# Patient Record
Sex: MALE | Race: WHITE | NOT HISPANIC OR LATINO | Employment: FULL TIME | URBAN - METROPOLITAN AREA
[De-identification: names, ages, dates, MRNs, and addresses within clinical notes are randomized per-mention and may not be internally consistent; named-entity substitution may affect disease eponyms.]

---

## 2023-07-19 LAB
ABO GROUP (TYPE) IN BLOOD: NORMAL
ALANINE AMINOTRANSFERASE (SGPT) (U/L) IN SER/PLAS: 14 U/L (ref 10–52)
ALBUMIN (G/DL) IN SER/PLAS: 4.2 G/DL (ref 3.4–5)
ALBUMIN (G/DL) IN SER/PLAS: 4.2 G/DL (ref 3.4–5)
ALKALINE PHOSPHATASE (U/L) IN SER/PLAS: 46 U/L (ref 33–120)
ANION GAP IN SER/PLAS: 14 MMOL/L (ref 10–20)
ANTIBODY SCREEN: NORMAL
ASPARTATE AMINOTRANSFERASE (SGOT) (U/L) IN SER/PLAS: 12 U/L (ref 9–39)
BASOPHILS (10*3/UL) IN BLOOD BY AUTOMATED COUNT: 0.04 X10E9/L (ref 0–0.1)
BASOPHILS/100 LEUKOCYTES IN BLOOD BY AUTOMATED COUNT: 0.5 % (ref 0–2)
BILIRUBIN DIRECT (MG/DL) IN SER/PLAS: 0.1 MG/DL (ref 0–0.3)
BILIRUBIN TOTAL (MG/DL) IN SER/PLAS: 0.7 MG/DL (ref 0–1.2)
CALCIDIOL (25 OH VITAMIN D3) (NG/ML) IN SER/PLAS: 34 NG/ML
CALCIUM (MG/DL) IN SER/PLAS: 9.3 MG/DL (ref 8.6–10.3)
CARBON DIOXIDE, TOTAL (MMOL/L) IN SER/PLAS: 25 MMOL/L (ref 21–32)
CHLORIDE (MMOL/L) IN SER/PLAS: 105 MMOL/L (ref 98–107)
CREATININE (MG/DL) IN SER/PLAS: 1.18 MG/DL (ref 0.5–1.3)
EOSINOPHILS (10*3/UL) IN BLOOD BY AUTOMATED COUNT: 0.1 X10E9/L (ref 0–0.7)
EOSINOPHILS/100 LEUKOCYTES IN BLOOD BY AUTOMATED COUNT: 1.3 % (ref 0–6)
ERYTHROCYTE DISTRIBUTION WIDTH (RATIO) BY AUTOMATED COUNT: 13.2 % (ref 11.5–14.5)
ERYTHROCYTE MEAN CORPUSCULAR HEMOGLOBIN CONCENTRATION (G/DL) BY AUTOMATED: 33.3 G/DL (ref 32–36)
ERYTHROCYTE MEAN CORPUSCULAR VOLUME (FL) BY AUTOMATED COUNT: 92 FL (ref 80–100)
ERYTHROCYTES (10*6/UL) IN BLOOD BY AUTOMATED COUNT: 5.09 X10E12/L (ref 4.5–5.9)
GFR MALE: 71 ML/MIN/1.73M2
GLUCOSE (MG/DL) IN SER/PLAS: 81 MG/DL (ref 74–99)
HEMATOCRIT (%) IN BLOOD BY AUTOMATED COUNT: 46.9 % (ref 41–52)
HEMOGLOBIN (G/DL) IN BLOOD: 15.6 G/DL (ref 13.5–17.5)
HEPATITIS B VIRUS SURFACE AB (MIU/ML) IN SERUM: 325.6 MIU/ML
HEPATITIS B VIRUS SURFACE AG PRESENCE IN SERUM: NONREACTIVE
HEPATITIS C VIRUS AB PRESENCE IN SERUM: NONREACTIVE
HIV 1/ 2 AG/AB SCREEN: NONREACTIVE
IMMATURE GRANULOCYTES/100 LEUKOCYTES IN BLOOD BY AUTOMATED COUNT: 0.5 % (ref 0–0.9)
INR IN PPP BY COAGULATION ASSAY: 1 (ref 0.9–1.1)
LEUKOCYTES (10*3/UL) IN BLOOD BY AUTOMATED COUNT: 8 X10E9/L (ref 4.4–11.3)
LYMPHOCYTES (10*3/UL) IN BLOOD BY AUTOMATED COUNT: 2.6 X10E9/L (ref 1.2–4.8)
LYMPHOCYTES/100 LEUKOCYTES IN BLOOD BY AUTOMATED COUNT: 32.6 % (ref 13–44)
MONOCYTES (10*3/UL) IN BLOOD BY AUTOMATED COUNT: 0.71 X10E9/L (ref 0.1–1)
MONOCYTES/100 LEUKOCYTES IN BLOOD BY AUTOMATED COUNT: 8.9 % (ref 2–10)
NATRIURETIC PEPTIDE B (PG/ML) IN SER/PLAS: 139 PG/ML (ref 0–99)
NEUTROPHILS (10*3/UL) IN BLOOD BY AUTOMATED COUNT: 4.48 X10E9/L (ref 1.2–7.7)
NEUTROPHILS/100 LEUKOCYTES IN BLOOD BY AUTOMATED COUNT: 56.2 % (ref 40–80)
PHOSPHATE (MG/DL) IN SER/PLAS: 3.6 MG/DL (ref 2.5–4.9)
PLATELETS (10*3/UL) IN BLOOD AUTOMATED COUNT: 236 X10E9/L (ref 150–450)
POTASSIUM (MMOL/L) IN SER/PLAS: 4.3 MMOL/L (ref 3.5–5.3)
PROTEIN TOTAL: 6.7 G/DL (ref 6.4–8.2)
PROTHROMBIN TIME (PT) IN PPP BY COAGULATION ASSAY: 10.9 SEC (ref 9.8–12.8)
RH FACTOR: NORMAL
SODIUM (MMOL/L) IN SER/PLAS: 140 MMOL/L (ref 136–145)
UREA NITROGEN (MG/DL) IN SER/PLAS: 20 MG/DL (ref 6–23)

## 2023-07-21 ENCOUNTER — HOSPITAL ENCOUNTER (OUTPATIENT)
Dept: DATA CONVERSION | Facility: HOSPITAL | Age: 58
End: 2023-07-22
Attending: INTERNAL MEDICINE | Admitting: INTERNAL MEDICINE
Payer: COMMERCIAL

## 2023-07-21 DIAGNOSIS — I42.8 OTHER CARDIOMYOPATHIES (MULTI): ICD-10-CM

## 2023-07-21 DIAGNOSIS — E78.5 HYPERLIPIDEMIA, UNSPECIFIED: ICD-10-CM

## 2023-07-21 DIAGNOSIS — I44.1 ATRIOVENTRICULAR BLOCK, SECOND DEGREE: ICD-10-CM

## 2023-07-21 DIAGNOSIS — I50.22 CHRONIC SYSTOLIC (CONGESTIVE) HEART FAILURE (MULTI): ICD-10-CM

## 2023-07-21 DIAGNOSIS — I34.0 NONRHEUMATIC MITRAL (VALVE) INSUFFICIENCY: ICD-10-CM

## 2023-07-21 DIAGNOSIS — D86.85 SARCOID MYOCARDITIS (HHS-HCC): ICD-10-CM

## 2023-07-21 LAB
ABO GROUP (TYPE) IN BLOOD: NORMAL
NIL(NEG) CONTROL SPOT COUNT: NORMAL
PANEL A SPOT COUNT: 0
PANEL B SPOT COUNT: 0
POS CONTROL SPOT COUNT: NORMAL
RH FACTOR: NORMAL
T-SPOT. TB INTERPRETATION: NEGATIVE

## 2023-07-22 LAB
ALBUMIN (G/DL) IN SER/PLAS: 3.8 G/DL (ref 3.4–5)
ANION GAP IN SER/PLAS: 12 MMOL/L (ref 10–20)
BASOPHILS (10*3/UL) IN BLOOD BY AUTOMATED COUNT: 0.02 X10E9/L (ref 0–0.1)
BASOPHILS/100 LEUKOCYTES IN BLOOD BY AUTOMATED COUNT: 0.1 % (ref 0–2)
CALCIUM (MG/DL) IN SER/PLAS: 8.9 MG/DL (ref 8.6–10.6)
CARBON DIOXIDE, TOTAL (MMOL/L) IN SER/PLAS: 26 MMOL/L (ref 21–32)
CHLORIDE (MMOL/L) IN SER/PLAS: 105 MMOL/L (ref 98–107)
CREATININE (MG/DL) IN SER/PLAS: 1 MG/DL (ref 0.5–1.3)
EOSINOPHILS (10*3/UL) IN BLOOD BY AUTOMATED COUNT: 0.02 X10E9/L (ref 0–0.7)
EOSINOPHILS/100 LEUKOCYTES IN BLOOD BY AUTOMATED COUNT: 0.1 % (ref 0–6)
ERYTHROCYTE DISTRIBUTION WIDTH (RATIO) BY AUTOMATED COUNT: 13.3 % (ref 11.5–14.5)
ERYTHROCYTE MEAN CORPUSCULAR HEMOGLOBIN CONCENTRATION (G/DL) BY AUTOMATED: 33 G/DL (ref 32–36)
ERYTHROCYTE MEAN CORPUSCULAR VOLUME (FL) BY AUTOMATED COUNT: 91 FL (ref 80–100)
ERYTHROCYTES (10*6/UL) IN BLOOD BY AUTOMATED COUNT: 4.55 X10E12/L (ref 4.5–5.9)
GFR MALE: 87 ML/MIN/1.73M2
GLUCOSE (MG/DL) IN SER/PLAS: 107 MG/DL (ref 74–99)
HEMATOCRIT (%) IN BLOOD BY AUTOMATED COUNT: 41.5 % (ref 41–52)
HEMOGLOBIN (G/DL) IN BLOOD: 13.7 G/DL (ref 13.5–17.5)
IMMATURE GRANULOCYTES/100 LEUKOCYTES IN BLOOD BY AUTOMATED COUNT: 0.4 % (ref 0–0.9)
LEUKOCYTES (10*3/UL) IN BLOOD BY AUTOMATED COUNT: 13.8 X10E9/L (ref 4.4–11.3)
LYMPHOCYTES (10*3/UL) IN BLOOD BY AUTOMATED COUNT: 1.08 X10E9/L (ref 1.2–4.8)
LYMPHOCYTES/100 LEUKOCYTES IN BLOOD BY AUTOMATED COUNT: 7.8 % (ref 13–44)
MAGNESIUM (MG/DL) IN SER/PLAS: 2.28 MG/DL (ref 1.6–2.4)
MONOCYTES (10*3/UL) IN BLOOD BY AUTOMATED COUNT: 0.98 X10E9/L (ref 0.1–1)
MONOCYTES/100 LEUKOCYTES IN BLOOD BY AUTOMATED COUNT: 7.1 % (ref 2–10)
NEUTROPHILS (10*3/UL) IN BLOOD BY AUTOMATED COUNT: 11.6 X10E9/L (ref 1.2–7.7)
NEUTROPHILS/100 LEUKOCYTES IN BLOOD BY AUTOMATED COUNT: 84.5 % (ref 40–80)
NRBC (PER 100 WBCS) BY AUTOMATED COUNT: 0 /100 WBC (ref 0–0)
PHOSPHATE (MG/DL) IN SER/PLAS: 4 MG/DL (ref 2.5–4.9)
PLATELETS (10*3/UL) IN BLOOD AUTOMATED COUNT: 178 X10E9/L (ref 150–450)
POTASSIUM (MMOL/L) IN SER/PLAS: 4.3 MMOL/L (ref 3.5–5.3)
SODIUM (MMOL/L) IN SER/PLAS: 139 MMOL/L (ref 136–145)
UREA NITROGEN (MG/DL) IN SER/PLAS: 16 MG/DL (ref 6–23)
VITAMIN D 1,25-DIHYDROXY: 35.9 PG/ML (ref 19.9–79.3)

## 2023-08-03 LAB
ATRIAL RATE: 73 BPM
P AXIS: 24 DEGREES
P OFFSET: 187 MS
P ONSET: 137 MS
PR INTERVAL: 156 MS
Q ONSET: 193 MS
QRS COUNT: 12 BEATS
QRS DURATION: 162 MS
QT INTERVAL: 498 MS
QTC CALCULATION(BAZETT): 548 MS
QTC FREDERICIA: 532 MS
R AXIS: 263 DEGREES
T AXIS: -15 DEGREES
T OFFSET: 442 MS
VENTRICULAR RATE: 73 BPM

## 2023-09-04 PROBLEM — E55.9 VITAMIN D DEFICIENCY: Status: ACTIVE | Noted: 2023-09-04

## 2023-09-04 PROBLEM — I50.20 SYSTOLIC HEART FAILURE, STAGE B (MULTI): Status: ACTIVE | Noted: 2023-09-04

## 2023-09-04 PROBLEM — F43.20 ADULT SITUATIONAL STRESS DISORDER: Status: ACTIVE | Noted: 2023-09-04

## 2023-09-04 PROBLEM — I50.20 SYSTOLIC HEART FAILURE, ACC/AHA STAGE C (MULTI): Status: ACTIVE | Noted: 2023-09-04

## 2023-09-04 PROBLEM — Z95.810 BIVENTRICULAR ICD (IMPLANTABLE CARDIOVERTER-DEFIBRILLATOR) IN PLACE: Status: ACTIVE | Noted: 2023-09-04

## 2023-09-04 PROBLEM — D86.85 CARDIAC SARCOIDOSIS (HHS-HCC): Status: ACTIVE | Noted: 2023-09-04

## 2023-09-04 PROBLEM — D86.0 PULMONARY SARCOIDOSIS (MULTI): Status: ACTIVE | Noted: 2023-09-04

## 2023-09-04 PROBLEM — Z95.0 CARDIAC PACEMAKER: Status: ACTIVE | Noted: 2023-09-04

## 2023-09-04 PROBLEM — I44.2 COMPLETE HEART BLOCK (MULTI): Status: ACTIVE | Noted: 2023-09-04

## 2023-09-04 PROBLEM — I48.0 PAROXYSMAL ATRIAL FIBRILLATION WITH RAPID VENTRICULAR RESPONSE (MULTI): Status: ACTIVE | Noted: 2023-09-04

## 2023-09-04 PROBLEM — D86.9 SARCOIDOSIS: Status: ACTIVE | Noted: 2023-09-04

## 2023-09-04 PROBLEM — I47.29 NONSUSTAINED VENTRICULAR TACHYCARDIA (MULTI): Status: ACTIVE | Noted: 2023-09-04

## 2023-09-04 PROBLEM — I44.30 PARTIAL AV BLOCK: Status: ACTIVE | Noted: 2023-09-04

## 2023-09-04 PROBLEM — E78.5 HYPERLIPIDEMIA: Status: ACTIVE | Noted: 2023-09-04

## 2023-09-04 RX ORDER — SACUBITRIL AND VALSARTAN 24; 26 MG/1; MG/1
1 TABLET, FILM COATED ORAL 2 TIMES DAILY
COMMUNITY
Start: 2022-06-24 | End: 2023-10-10 | Stop reason: ALTCHOICE

## 2023-09-04 RX ORDER — EMPAGLIFLOZIN 10 MG/1
1 TABLET, FILM COATED ORAL DAILY
COMMUNITY
Start: 2023-07-11

## 2023-09-04 RX ORDER — ACETAMINOPHEN 500 MG
1 TABLET ORAL DAILY
COMMUNITY

## 2023-09-04 RX ORDER — SULFAMETHOXAZOLE AND TRIMETHOPRIM 800; 160 MG/1; MG/1
1 TABLET ORAL DAILY
COMMUNITY
Start: 2022-04-26 | End: 2024-02-13 | Stop reason: WASHOUT

## 2023-09-04 RX ORDER — SPIRONOLACTONE 25 MG/1
1 TABLET ORAL DAILY
COMMUNITY
Start: 2023-07-11

## 2023-09-04 RX ORDER — VALACYCLOVIR HYDROCHLORIDE 500 MG/1
1 TABLET, FILM COATED ORAL DAILY
COMMUNITY
Start: 2015-06-10 | End: 2024-02-13 | Stop reason: WASHOUT

## 2023-09-04 RX ORDER — SACUBITRIL AND VALSARTAN 24; 26 MG/1; MG/1
1 TABLET, FILM COATED ORAL 2 TIMES DAILY
COMMUNITY
End: 2023-10-18 | Stop reason: SDUPTHER

## 2023-09-04 RX ORDER — PREDNISONE 20 MG/1
1.5 TABLET ORAL DAILY
COMMUNITY
Start: 2022-04-26 | End: 2023-10-10 | Stop reason: ALTCHOICE

## 2023-09-04 RX ORDER — ACETAMINOPHEN 325 MG/1
2 TABLET ORAL EVERY 6 HOURS PRN
COMMUNITY
Start: 2021-03-08 | End: 2023-10-26 | Stop reason: WASHOUT

## 2023-09-04 RX ORDER — PREDNISONE 10 MG/1
1 TABLET ORAL DAILY
COMMUNITY
Start: 2022-04-26 | End: 2024-02-13 | Stop reason: WASHOUT

## 2023-09-04 RX ORDER — TALC
1 POWDER (GRAM) TOPICAL NIGHTLY PRN
COMMUNITY
Start: 2021-03-08 | End: 2023-10-26 | Stop reason: WASHOUT

## 2023-09-29 VITALS
TEMPERATURE: 97 F | DIASTOLIC BLOOD PRESSURE: 63 MMHG | HEIGHT: 69 IN | RESPIRATION RATE: 16 BRPM | BODY MASS INDEX: 28.67 KG/M2 | HEART RATE: 75 BPM | SYSTOLIC BLOOD PRESSURE: 101 MMHG

## 2023-09-30 NOTE — DISCHARGE SUMMARY
Send Summary:   Discharge Summary Providers:  Provider Role Provider Name   · Referring Kolton Valdes   · Attending Saw Shannon   · Primary Adrian Li       Note Recipients:        Discharge:    Summary:   Admission Date: .21-Jul-2023 05:41:00   Discharge Date: 22-Jul-2023   Admission Reason: CRT-D upgrade and lead extraction   Final Discharge Diagnoses: s/p CRT-D upgrade and  lead extraction   Procedures: Date: 21-Jul-2023 14:53:00  Procedure Name: RV lead extraction, CRT-D upgrade   Vital Signs:        T   P  R  BP   MAP  SpO2   Value  36.6  79  16  106/64   75  97%  Date/Time 7/22 8:00 7/22 11:00 7/22 11:00 7/22 10:00  7/22 10:00 7/22 11:00  Range  (36.1C - 36.9C )  (67 - 82 )  (10 - 18 )  (81 - 106 )/ (52 - 67 )  (64 - 77 )  (94% - 98% )   As of 21-Jul-2023 14:00:00, patient is on 2 L/min of oxygen via room air.  Highest temp of 36.9 C was recorded at 7/21 16:00    Date:            Weight/Scale Type:  Height:   22-Jul-2023 04:00  87.3  kg / bed       Physical Exam:    General: awake, alert, conversant, appears stated age  HENT: NCAT, EOMI  Eyes: no scleral icterus   Skin: no suspect lesions or rashes noted  on visible skin  Cardiac: RRR, normal S1, S2, no M/R/G, R fem site bandaged with no active sites of bleeding  Pulm: CTAB, normal respiratory effort, on room air  Abdomen: soft, ND, NT, no involuntary guarding or rebound tenderness  :  indwelling urinary catheter draining yellow urine  EXT: no peripheral edema, no asymmetry noted  MSK: no focal joint swelling noted  Neuro: AOx3, able to follow commands, no gross FND  Psych: coherent thought process, appropriate mood  and affect  Hospital Course:    Adams Song is a 59 yo M w/ PMHx stage C systolic HF/presumed NICM (sarcoid)/HFrEF (EF 35-40%, recent echo 20-25%) and high grade AVB (Mobitz II) s/p DC-PPM from suspected  sarcoidosis admitted to CICU s/p lead extraction and CRT-D upgrade. Underwent CRT-D upgrade, RV lead removal with RV coil  placement and CS lead placement on 7/21. HDS. Discharged with Eliquis (to start 7/23) and Keflex. Will follow up with EP.       Discharge Information:    and Continuing Care:   Lab Results - Pending:    Surgical Pathology Drawn at 21-Jul-2023 12:08:00  Radiology Results - Pending: None   Discharge Instructions:    Activity:           You are advised to go directly home from the hospital          DO NOT lift anything heavier than 10 pounds for one week, this allows for proper healing of the groin          No excessive exercise or treadmill use for one week. You may walk and do stairs, slowly          No sexual activities for 24 hours after you arrive home    Nutrition/Diet:           You may resume your normal diet. However it is better to start with liquids such as juices then soup and crackers, and gradually work up to solid foods    Wound Care:           If slight bleeding should occur at site, lie down and have someone apply firm pressure just above the puncture site for 5 minutes.  If it continues or is profuse, call 911. Always notify your doctor if bleeding occurs          Keep site clean and dry. Let air dry or you may use a simple bandaid          Gently cleanse the puncture site in your groin with soap and water only          You may experience some tenderness, bruising or minimal inflammation.  If you have any concerns, you may contact the Cath Lab or if any of these symptoms become excessive, contact your cardiologist or go to the emergency room          No tub baths, soaking, or swimming for one week          May shower the next day after your procedure    Additional Orders:           Additional Instructions:   Please continue your home medication as prescribed and START the following medication:  -Eliquis 5 mg by mouth twice a day. START this medication on Sunday 7/23 in the evening and continue taking it daily. This is a blood thinner for your atrial fibrillation. You should not take any blood thinners  for 48 hours after your procedure  -Keflex 500 mg by mouth twice a day, this is an antibiotic. Take this tonight once, then starting tomorrow twice a day, then once on the 28th (your last day)    You will follow up with the EP doctors for your device and atrial fibrillation. They will call you once an appointment has been made.     Discharge Instructions for your Cardiac Device   CARDIAC DEVICE CLINIC  [phone]              Incision:   1.  Keep your incision clean and dry for 1 week.  2. My shower 7 days after the procedure. Do not submerge the incision in a tub, pool, hot tub, or lake for 4 weeks.  3. Your incision should look better each day. If you notice unusual swelling, redness, drainage or fever greater than 100 degrees, please call the Device Clinic or your Doctor?s office.  4. Avoid using deodorants, powders, creams, lotions, etc on your incision for 4 weeks.   5. There are no stitches to be removed.  If you received a ?glue? closure this may appear purple-gray and does not get removed but wears away slowly on its own.  Steri-strips (small white bandages) may be removed in one week or they  may fall off on their own earlier.  Pain:  1. It is normal for the area around the incision to be tender for a few weeks following surgery. Pain relievers such as Tylenol or motrin (whichever you can take) are usually sufficient for pain relief. If the pain gets worse instead of better than  please call the Device Clinic or your Doctor.  Activity:  1. If you have a new device and new leads placed than avoid raising your arm above shoulder level for 4 weeks. Do no  anything weighing more than 15 pounds.   2. Avoid exercising with the arm on the side of your pacemaker.  So no golf, swimming, tennis, bowling etc for 4 weeks.  3. Driving: If you were driving prior to your procedure, you may resume driving in 1 week. If you experienced a loss of consciousness prior to your procedure, you should verify with your  Doctor when you are able to resume driving.  ID CARD:  1. It is important to carry your device ID card with you at all times.   2. Inform doctors and health care providers that you have a pacemaker or defibrillator.  Electromagnetic Interference:  1. Microwave ovens are safe to use.  2. Cellular phones should be held to the opposite ear from your device. Do not carry your phone in your shirt pocket. Some i-phones that self-charge can interfere with your device so be sure to keep it away from your pacemaker/defibrillator.   3. Read the Patient Booklet for more information. You may call either the Device Clinic [phone]  or the patient services of the device  with questions about specific electrical appliances and interference problems.    IT IS YOUR RESPONSIBILITY TO MAKE AND KEEP APPOINTMENTS.   Please refer to your Device clinic handout.     Discharge Medications: Home Medication   Daily Multi oral tablet - 1 tab(s) orally once a day  atorvastatin 40 mg oral tablet - 1 tab(s) orally once a day  Multiple Vitamins with Minerals oral tablet - 1 tab(s) orally once a day  apixaban 5 mg oral tablet - 1 tab(s) orally 2 times a day   predniSONE 10 mg oral tablet - 1 tab(s) orally every 24 hours  sacubitril-valsartan 24 mg-26 mg oral tablet - 1 tab(s) orally 2 times a day  empagliflozin 10 mg oral tablet - 1 tab(s) orally once a day  cephalexin 500 mg oral capsule - 1 cap(s) orally every 12 hours  spironolactone - 12.5 milligram(s) orally once a day     PRN Medication   melatonin 3 mg oral tablet - 1 tab(s) orally once (at bedtime), As needed, Insomnia  acetaminophen 325 mg oral tablet - 2 tab(s) orally every 6 hours, As needed, Pain - Mild (1-3)     DNR Status:   ·  Code Status Code Status order at time of discharge: Full Code     Attestation:   Note Completion:  I am a:  Resident/Fellow   Attending Attestation I saw and evaluated the patient.  I personally obtained the key and critical portions of the  history and physical exam or was physically present for key and  critical portions performed by the resident/fellow. I reviewed the resident/fellow?s documentation and discussed the patient with the resident/fellow.  I agree with the resident/fellow?s medical decision making as documented in the note.     I personally evaluated the patient on 22-Jul-2023   Comments/ Additional Findings    Discharge planning > 30 minutes.          Electronic Signatures:  Saw Shannon (MD)  (Signed 22-Jul-2023 13:28)   Authored: Summary Content, Note Completion   Co-Signer: Send Summary, Summary Content, Note Completion  Jo Miller (Resident))  (Signed 22-Jul-2023 12:55)   Authored: Send Summary, Summary Content, Ongoing Care,  DNR Status, Note Completion      Last Updated: 22-Jul-2023 13:28 by Saw Shannon)

## 2023-09-30 NOTE — H&P
Service:   Critical Care Service:  ·  Service CICU     History of Present Illness:   HPI:    Adams Song is a 57 yo M w/ PMHx stage C systolic HF/presumed NICM  (sarcoid)/HFrEF (EF 35-40%) and high grade AVB (Mobitz II) s/p DC-PPM from suspected sarcoidosis admitted to CICU s/p lead extraction and CRT-D upgrade.    At last clinic visit, denied CP, palpitations, SOB, orthopnea, PND. Had some exertional dyspnea. Repeat echo obtained showing progressive  decline in LV function and dilation.    Underwent CRT-D upgrade, RV lead removal with RV coil placement and CS lead placementon 7/21    CBC WBC 8 Hb 15.6 Plt 236  RFP Na 140 K 4.3 Cl 105 HCO3 25 BUN 20 SCr 1.18  Glc 81 Ca 9.3 Phos 3.6  LFTs AST 12, ALT 14, alkphos 46, Tbili 0.7  Vit D 34    PT 10.9, INR 1    Cardiac hx:  Follows with advanced heart failure outpatient    Patient was admitted in 2021 for syncope, his ECG and holter showed pauses with Mobitz type II, EP and cardiology were consulted at that time and Echo showed global dyskinesia with EF of 40-45%, cath showed clean coronaries. Had DDDR pacemaker placed  by Dr. Hyman .     Echo (7/10/23):  1. Left ventricular systolic function is severely decreased with a 20-25% estimated ejection fraction. 3D with contrat ej fx 21%, posterior akinenitic scar, severe dilation,  no thrombus  2. Left ventricular cavity size is severely dilated.  3. There are multiple wall motion abnormalities.  4. Multiple segmental abnormalities exist. See findings.  5. There is low normal right ventricular systolic function.  6. Aortic valve sclerosis.  7. Moderate mitral valve regurgitation.    ECG (3/28/23):  A-V paced (HR 74)    PET scan (3/2/23):  1. Assuming appropriate fasting, there is increased metabolic activity along the basal 2/3 of the anterior, lateral, inferior, and septal left ventricular wall consistent  with an inflammatory process such as myocarditis or sarcoidosis.  2. Perfusion defect in the inferior 1/3  of the inferior-septal wall, and a small territory of perfusion defect in the inferior apex, suggestive of myocardial scarring.  3. Cardiomegaly with decreased ejection fraction at 34% (normal above 45%).  4. Hypermetabolic left hilar, left paratracheal, and right paratracheal lymph nodes are consistent with sarcoidosis involvement.    cMRI (4/5/22):  1. Biventricular Cardiomyopathy. Differential includes but is not limited to cardiac sarcoidosis,  arrhythmogenic rightventricular, cardiomyopathy, hypertrophic cardiomyopathy.  The findings of this study are consistent with a single major CMR criteria for the diagnosis of  arrhythmogenic right ventricular cardiomyopathy per the 2010 Task Force criteria( focal RV dyskinesis and RV  EF </= 40%).    2. Extensive confluent hyperenhancement the basal/mid inferior and inferior septal walls with partial  sparing the left ventricular basal/mid subendocardial myocardium. Subendocardial hyperenhancement of the basal/mid inferior and lateral right ventricular myocardium.    3. T2 STIR: Increased signal intensity of the mid basal inferiorseptum and lateral RV myocardium. Finding  consistent with myocardial inflammation/edema.    4. Asymmetric septal hypertrophy. Maximal septal thickness mid inferior septum 1.3 cm.    5. Dilated left ventricular cavity size with moderately depressed systolic function. Ejection fraction 37%.    6. Dilated right ventricular cavity size with mild-moderate depressed RV systolic function. RV EF 40%.  Focaldyskinesis of the basal inferior wall.    7. Compared to previous cardiac MRI 6/29/21 there is an increase in left ventricular and right ventricular  cavity volumes and decrease in left ventricular and right ventricular systolic function.    cMRI (6/29/21):  1. The findings of this study are suggestive of cardiac sarcoidosis. Differential would also include but is  not limited to hypertrophic cardiomyopathy, arrhythmogenic right ventricular  cardiomyopathy.    2. Extensive confluent hyperenhancement the basal/mid inferior and inferior septal walls with partial  sparing the left ventricular basal/mid subendocardial myocardium. Subendocardial hyperenhancement of the  basal/mid inferior right ventricular myocardium.    3. T2 STIR: Increased signal intensity of the mid basal inferior septum. Finding consistent with myocardial  inflammation/edema.    4. Asymmetric septal hypertrophy. Maximal septal thickness mid inferior septum 1.9 cm.    5. Normal left ventricular cavity size with moderately depressed systolic function. Ejection fraction 42%.    6. Normal right ventricular cavity size with mild-moderate depressed. RV systolic function. RV EF 45%. Focal  dyskinesis of the basal inferior wall.    Echo (3/6/21):  1. The left ventricular systolic function is mildly decreased.  2. Multiple segmental abnormalities exist. See findings.  3. Aortic valve stenosis is not present.  4. There is global hypokinesis of the left ventricle with minor regional variations.    Minimal CAD by catheterization 3/8/2021:  -Left main coronay normal  -LAD: scattered mild disease throughout its length with no significant obstruction, 2 sizable diagonal branches with mild disease  -Left circumflex coronary artery: non-dominant, scattered mild disease but no significant obstructions, one sizable obtuse marginal with minimal disease  -Right coronary artery: dominant vessel and appeared angiographically normal, the PDA and one sizable PLB appeared with minimal disease.  -Left ventricular angiogram: normal size LV, normal EF (55%) LVEDP 2mm Hg    PAST MEDICAL HISTORY: per HPI    MEDICATIONS: atorvastatin 10 mg daily, entresto 24-26, Jardiance 10 mg daily, prednisone 10 mg daily, spironolactone 12.5 mg daily    ALLERGIES: NKDA    FAMILY HX:  Mother: HTN, Afib, [place], HFpEF, living age 81/82  Father: CAD s/p CABG x 4 in 70's, living age 75  Oldest Brother: CAD, recent cath, needs PCI, living  age 60  Paternal grandmother:  of heart problems  Paternal Uncles x 3:  of MI's  Maternal grandfather: Pacemaker  Paternal grandmother: CVA,  from MI later     SOCIAL HX:  Works full time as bread /salesman.  Tobacco: None past or current  EtOH: rare  No IVDU    REVIEW OF SYSTEMS: A 14 point review of systems was asked. All answers to questions were negative except for pertinent positives listed in the HPI.       Comorbidities:   Comorbidites:  ·  Comorbid Conditions congestive heart failure   ·  Type of Congestive Heart Failure systolic   ·  CHF Additional Specificity N/A   ·  Acuity of CHF chronic      Social History:   Social History:  Smoking Status never smoker (1)   Alcohol Use denies(1)   Drug Use denies (1)   Drug 2 Use denies (1)              Allergies:  ·  No Known Allergies :     Medications Prior to Admission:   Home meds have been reviewed, but review is not yet complete  atorvastatin 40 mg oral tablet: 1 tab(s) orally once a day  melatonin 3 mg oral tablet: 1 tab(s) orally once (at bedtime), As needed, Insomnia  Jardiance: null.    Objective:   Objective Information:    ·  Objective Information      T   P  R  BP   MAP  SpO2   Value  36.9  82  16         96%  Date/Time  16:00  17:00  17:00       17:00  Range  (36.6C - 36.9C )  (71 - 82 )  (10 - 16 )      (95% - 98% )   As of 2023 14:00:00, patient is on 2 L/min of oxygen via room air.  Highest temp of 36.9 C was recorded at  16:00      Pain reported at  16:00: 0 = None      Direct Arterial Blood Pressure  Systolic 113 (102 - 113)  17:00  Diastolic (mm Hg) 56 (52 - 56)  17:00  Mean (mm Hg) 73 (66 - 73)  17:00  Pulse Pressure (mm Hg) 57 (48 - 57)  17:00      ---- Intake and Output  -----  Mn/Dy/Year Time  Intake   Output  Net  2023 2:00 pm  0   400  -400        Physical Exam Narrative:  ·  Physical Exam:    General: awake, alert, conversant, appears stated age  HENT: NCAT,  EOMI  Eyes: no scleral icterus   Skin: no suspect lesions  or rashes noted on visible skin  Cardiac: RRR, normal S1, S2, no M/R/G, R fem site bandaged with no active sites of bleeding  Pulm: CTAB, normal respiratory effort, on room air  Abdomen: soft, ND, NT, no involuntary guarding or rebound tenderness   : indwelling urinary catheter draining yellow urine  EXT: no peripheral edema, no asymmetry noted  MSK: no focal joint swelling noted  Neuro: AOx3, able to follow commands, no gross FND  Psych: coherent thought process, appropriate mood  and affect      Medications:    Medications:          Continuous Medications       --------------------------------  No continuous medications are active       Scheduled Medications       --------------------------------    1. Atorvastatin:  40  mg  Oral  Daily    2. Cephalexin:  500  mg  Oral  Every 12 Hours    3. Empagliflozin:  10  mg  Oral  Daily    4. Multivitamin with Minerals:  1  tablet(s)  Oral  Daily    5. predniSONE:  10  mg  Oral  Every 24 Hours    6. Sacubitril 24 mg - Valsartan 26 m  tablet(s)  Oral  2 Times a Day    7. Spironolactone:  12.5  mg  Oral  Daily         PRN Medications       --------------------------------    1. Acetaminophen:  325  mg  Oral  Every 4 Hours    2. Melatonin:  3  mg  Oral  Daily 1800          Results:        Impression:    1. Lungs clear, without consolidation.  2. No pulmonary venous congestion, chronic cardiac silhouette  enlargement; new cardiac pacer in good position.           Signed by Collin Schumacher MD     Xray Chest 1 View [2023  5:11PM]      Impression:  Xray Chest 1 View [2023  3:25PM]      Assessment and Plan:   Neurology:  Diagnosis:    Adams Song is a 59 yo M w/ PMHx stage C systolic HF/presumed NICM (sarcoid)/HFrEF (EF 35-40%) and high grade AVB (Mobitz  II) s/p DC-PPM from suspected sarcoidosis, [facility] and CRT-D upgrade for monitoring.     CV:  #S/p CRT-D upgrade and  lead replacement 7/21  -Monitoring post-procedure in CICU  -Chest XR today  -2 view chest XR tomorrow  -Limited echo tomorrow  -Remain   -Monitor R fem access site for signs of bleeding  -Patient to lay flat for 2 hours  -If concerns for diaphragmatic pacing, would contact EP  -Hold AC for 2 days, will resume AC at discharge for Afib  -EP follow up at discharge  -Keflex 500 mg BID for 7 days    #High grade AVB (Mobitz II) s/p dc-ppm secondary to sarcoidosis  ::Per outpatient notes: cMRI with LV dysfunction and findings consistent with cardiac sarcoidosis, including both scar and inflammation. PET finally approved and has evidence concerning for extra-cardiac sarcoidosis.   -c/w prednisone 10 mg daily    #Stage C chronic systolic HF/presumed NICM (sarcoid)/HFrEF (LVEF 35-40-->20%; 7/2023)  ::BB previously deferred given low resting HR  -c/w entresto 24/26 mg bid, spironolactone 12.5 mg daily, jardiance 10 mg daily    #HLD  -C/w home statin    Pulm  #Sarcoidosis  -Following with Dr. Goodrich outpatient  -Will get bronch with biopsy outpatient   -C/w home prednisone 10 mg daily       F: bolus PRN  E: replete PRN  N: regular  A: PIV    DVT PPx: holding  GI PPx: N/A    CODE STATUS: Full code (confirmed on admission)  SURROGATE DECISION MAKER: Wife Jaswant [phone]            Code Status:  ·  Code Status Full Code     Attestation:   Note Completion:  I am a:  Resident/Fellow   Attending Attestation I saw and evaluated the patient.  I personally obtained the key and critical portions of the history and physical exam or was physically present for key and  critical portions performed by the resident/fellow. I reviewed the resident/fellow?s documentation and discussed the patient with the resident/fellow.  I agree with the resident/fellow?s medical decision making as documented in the note.     I personally evaluated the patient on 21-Jul-2023   Comments/ Additional Findings    RV lead extraction and CRT_D upgrade performed in  OR with CT surgery back up. Patient to CICU for post-operative monitoring. Atrial fibrillation  documented on ICD tracings. Will start DOAC after discharge for CVA prophylaxis.    Critical Care Patient I have reviewed and evaluated the most recent data and results, personally examined the patient, and formulated the plan of care as presented above.  This patient  was critically ill and required continued critical care treatment. Teaching and any separately billable procedures are not included in the time calculation.    Billing Provider Critical Care Time 30 minute(s)   Primary Critical Care Issue/Treatment (See Assessment and Plan for greater detail) -- This patient is, or has been, hemodynamically unstable.  We are treating with appropriate medications, as well as doing intensive diagnostic evaluation and  monitoring. Please see assessment and plan above for greater detail.; -- This patient has had, or currently has, a cardiac dysrhythmia. We are treating with appropriate medications, such as antiarrhythmic agents and/or other cardiovascular agents, hemodynamic  support, cardioversion as indicated, as well as doing intensive diagnostic evaluation and monitoring. Please see assessment and plan above for greater detail.; -- This patient has undergone a major operation or significant procedure and must have intensive  monitoring and management to diagnose or prevent life or limb threatening deterioration. Please see assessment and plan above for greater detail.; -- For the nature of the critical condition and treatment, this documentation has been prepared by the attending  physician/NEHA- billing provider of these critical care services.; -- This patient is believed to have significant heart failure requiring careful monitoring of fluid and respiratory status, including intensive treatment with cardiovascular medications  or devices, as indicated. Please see assessment and plan above for greater detail.  "        Electronic Signatures:  Saw Shannon)  (Signed 22-Jul-2023 13:17)   Authored: History of Present Illness, Comorbidities,  Note Completion   Co-Signer: Service, History of Present Illness, Comorbidities, Social History, Allergies, Medications Prior to Admission, Objective, Assessment  and Plan, Note Completion  Jo Miller (Resident))  (Signed 21-Jul-2023 17:32)   Authored: Service, History of Present Illness, Comorbidities,  Social History, Allergies, Medications Prior to Admission, Objective, Assessment and Plan, Note Completion      Last Updated: 22-Jul-2023 13:17 by Saw Shannon)    References:  1.  Data Referenced From \"Patient Profile - Adult v2\" 21-Jul-2023 13:20   "

## 2023-09-30 NOTE — H&P
History of Present Illness:   History Present Illness:  Reason for surgery: RE lead extraction ans CRT-D  upgrade   HPI:      Mr. Song is a 57 y/o M with a PMHx sig for stage C systolic HF/presumed NICM (sarcoid)/HFrEF (LVEF 25%) and high grade AVB (Mobitz II) s/p dc-ppm from  suspected sarcoidosis   Presented for RV lead extraction, and RV coil placement, and CS lead placement.         Echo (7/10/23):  1. Left ventricular systolic function is severely decreased with a 20-25% estimated ejection fraction. 3D with contrat ej fx 21%, posterior akinenitic scar, severe dilation, no thrombus  2. Left ventricular cavity size is severely dilated.  3. There are multiple wall motion abnormalities.  4. Multiple segmental abnormalities exist. See findings.  5. There is low normal right ventricular systolic function.  6. Aortic valve sclerosis.  7. Moderate mitral valve regurgitation.      SocHx:   , lives with long time girlfriend in [place]. Works full time as bread /salesman.  Tobacco: None past or current  EtOH: Socially at special events, no routine use  Recreational: None past or current    FamHx:  Mother: HTN, Afib, TR[place]ON, HFpEF, living age 81/82  Father: CAD s/p CABG x 4 in 70's, living age 75  Oldest Brother: CAD, recent cath, needs PCI, living age 60  Paternal grandmother:  of heart problems  Paternal Uncles x 3:  of MI's  Maternal grandfather: Pacemaker  Paternal grandmother: CVA,  from MI later         Allergies:        Allergies:  ·  No Known Allergies :     Home Medication Review:   Home Medications Reviewed: yes     Impression/Procedure:   ·  Impression and Planned Procedure: CRT-D upgrade       ERAS (Enhanced Recovery After Surgery):  ·  ERAS Patient: no       Vital Signs:  Temperature C: 36.1 degrees C   Temperature F: 96.9 degrees F   Heart Rate: 75 beats per minute   Respiratory Rate: 16 breath per minute   Blood Pressure Systolic: 101 mm/Hg   Blood Pressure Diastolic:  63 mm/Hg     Physical Exam by System:    Respiratory/Thorax: Patent airways, CTAB, normal  breath sounds with good chest expansion, thorax symmetric   Cardiovascular: Regular, rate and rhythm, no murmurs,  2+ equal pulses of the extremities, normal S 1and S 2     Airway/Sedation Assessment:  ·  Assmentment by Anesthesia See anesthesia airway/sedation assessment.     Consent:   COVID-19 Consent:  ·  COVID-19 Risk Consent Surgeon has reviewed key risks related to the risk of mathew COVID-19 and if they contract COVID-19 what the risks are.     Coronavirus Attestation of Need for Surgery:  ·  COVID-19 Surgery / Procedure Attestation: Select all criteria that apply Risk of metastasis or progression of staging if delayed       Electronic Signatures:  Kolton Valdes)  (Signed 21-Jul-2023 23:43)   Authored: Physical Exam, Note Completion   Co-Signer: History of Present Illness, Allergies, Home Medication Review, Impression/Procedure, ERAS, Physical Exam, Consent  Kolton Camargo (Resident))  (Signed 21-Jul-2023 07:42)   Authored: History of Present Illness, Allergies, Home  Medication Review, Impression/Procedure, ERAS, Physical Exam, Consent      Last Updated: 21-Jul-2023 23:43 by Kolton Valdes)

## 2023-10-10 ENCOUNTER — OFFICE VISIT (OUTPATIENT)
Dept: CARDIOLOGY | Facility: CLINIC | Age: 58
End: 2023-10-10
Payer: COMMERCIAL

## 2023-10-10 VITALS
HEIGHT: 69 IN | BODY MASS INDEX: 28.44 KG/M2 | OXYGEN SATURATION: 97 % | DIASTOLIC BLOOD PRESSURE: 74 MMHG | HEART RATE: 76 BPM | SYSTOLIC BLOOD PRESSURE: 115 MMHG | WEIGHT: 192 LBS

## 2023-10-10 DIAGNOSIS — D86.85 CARDIAC SARCOIDOSIS (HHS-HCC): ICD-10-CM

## 2023-10-10 DIAGNOSIS — I50.20 SYSTOLIC HEART FAILURE, ACC/AHA STAGE C (MULTI): Primary | ICD-10-CM

## 2023-10-10 DIAGNOSIS — Z00.00 ANNUAL PHYSICAL EXAM: ICD-10-CM

## 2023-10-10 DIAGNOSIS — Z95.810 BIVENTRICULAR ICD (IMPLANTABLE CARDIOVERTER-DEFIBRILLATOR) IN PLACE: ICD-10-CM

## 2023-10-10 PROCEDURE — 1036F TOBACCO NON-USER: CPT | Performed by: INTERNAL MEDICINE

## 2023-10-10 PROCEDURE — 99214 OFFICE O/P EST MOD 30 MIN: CPT | Performed by: INTERNAL MEDICINE

## 2023-10-10 RX ORDER — METOPROLOL SUCCINATE 25 MG/1
25 TABLET, EXTENDED RELEASE ORAL DAILY
Qty: 30 TABLET | Refills: 11 | Status: SHIPPED | OUTPATIENT
Start: 2023-10-10 | End: 2024-10-09

## 2023-10-10 ASSESSMENT — ENCOUNTER SYMPTOMS
DEPRESSION: 1
OCCASIONAL FEELINGS OF UNSTEADINESS: 0
LOSS OF SENSATION IN FEET: 0

## 2023-10-10 NOTE — PATIENT INSTRUCTIONS
To reach Dr. De La Vega' office please call [phone] (Joanna). Fax [phone]. Call [phone] to schedule an appointment. You may also contact the HF RNs at [email or web address] or [phone] (option 6).     1) Continue your current medications  2) We will repeat an echocardiogram next month  3) Start metoprolol succinate 25 mg once a day  4) Follow up in 3 months at /[facility]    Pharmacy:  [facility]  [place]

## 2023-10-10 NOTE — PROGRESS NOTES
History of Present Illness  Referring cardiologist: Michael    Mr. Song is a 58M with a PMHx sig for stage C systolic HF/presumed NICM (sarcoid)/HFrEF and high grade AVB (Mobitz II) s/p dc-ppm from suspected sarcoidosis who returns to the Advanced Heart Failure clinic for ongoing evaluation and management.    Interval hx:   He underwent successful lead extraction and implantation of CRT-D in July.     Currently denies chest pain, shortness of breath. Patient has complaints of palpitations, dyspnea on exertion. Patient denies orthopnea, PND. No edema noted in BLE. Patient denies headaches, dizziness or recent falls.       Hospitalizations: July (CRT upgrade)  Medication adherence: Patient states yes   Diet: Patient denies   Exercise: Patient denies       SocHx:   , lives with long time girlfriend in [place]. Works full time as bread /salesman.  Tobacco: None past or current  EtOH: Socially at special events, no routine use  Recreational: None past or current    FamHx:  Mother: HTN, Afib, TR[place]ON, HFpEF, living age 81/82  Father: CAD s/p CABG x 4 in 70's, living age 75  Oldest Brother: CAD, recent cath, needs PCI, living age 60  Paternal grandmother:  of heart problems  Paternal Uncles x 3:  of MI's  Maternal grandfather: Pacemaker  Paternal grandmother: CVA,  from MI later        Current Meds    Medication Name Instruction   Entresto 24-26 MG Oral Tablet TAKE 1 TABLET BY MOUTH TWICE DAILY   predniSONE 10 MG Oral Tablet TAKE  1  TABLET Daily   predniSONE 10 MG Oral Tablet Take 1 tablet daily   valACYclovir HCl - 500 MG Oral Tablet TAKE 1 TABLET DAILY.   Vitamin D3 125 MCG (5000 UT) Oral Tablet 1 tablet daily     Allergies  NoKnown    · No Known Allergies  Recorded By: Jennifer Weinberg; 2015 10:06:29 AM    Visit Vitals  /74 (BP Location: Right arm, Patient Position: Sitting)   Pulse 76       Physical Exam  On exam Mr. Song appears his stated age, is alert and oriented  x3, and in no acute distress. His sclera are anicteric and his oropharynx has moist mucous membranes. His neck is supple and without thyromegaly. The JVP is ~5 cm of water above the right atrium. His cardiac exam has regular rhythm, normal S1, S2. No S3/4. There are no murmurs. His lungs are clear to auscultation bilaterally and there is no dullness to percussion. His abdomen is soft, nontender with normoactive bowel sounds. There is no HJR. The extremities are warm and without edema. The skin is dry. There is no rash present. The distal pulses are 2+ in all four extremities. His mood and affect are appropriate for todays encounter.       Results/Data  Echo (7/10/23):  1. Left ventricular systolic function is severely decreased with a 20-25% estimated ejection fraction. 3D with contrat ej fx 21%, posterior akinenitic scar, severe dilation, no thrombus  2. Left ventricular cavity size is severely dilated.  3. There are multiple wall motion abnormalities.  4. Multiple segmental abnormalities exist. See findings.  5. There is low normal right ventricular systolic function.  6. Aortic valve sclerosis.  7. Moderate mitral valve regurgitation.    ECG (3/28/23):  A-V paced (HR 74)    PET scan (3/2/23):  1. Assuming appropriate fasting, there is increased metabolic  activity along the basal 2/3 of the anterior, lateral, inferior, and  septal left ventricular wall consistent with an inflammatory process  such as myocarditis or sarcoidosis.  2. Perfusion defect in the inferior 1/3 of the inferior-septal wall,  and a small territory of perfusion defect in the inferior apex,  suggestive of myocardial scarring.  3. Cardiomegaly with decreased ejection fraction at 34% (normal above  45%).  4. Hypermetabolic left hilar, left paratracheal, and right  paratracheal lymph nodes are consistent with sarcoidosis involvement.    cMRI (4/5/22):  1. Biventricular Cardiomyopathy.  Differential includes but is not limited to cardiac  sarcoidosis,  arrhythmogenic rightventricular  cardiomyopathy, hypertrophic cardiomyopathy.  The findings of this study are consistent with a single major CMR  criteria for the diagnosis of  arrhythmogenic right ventricular cardiomyopathy per the 2010 Task  Force criteria(focal RV dyskinesis and RV  EF </= 40%).    2. Extensive confluent hyperenhancement the basal/mid inferior and  inferior septal walls with partial  sparing the left ventricular basal/mid subendocardial myocardium.  Subendocardial hyperenhancement of the basal/mid inferior and lateral  right ventricular myocardium.    3. T2 STIR: Increased signal intensity of the mid basal inferior  septum and lateral RV myocardium. Finding  consistent with myocardial inflammation/edema.    4. Asymmetric septal hypertrophy. Maximal septal thickness mid  inferior septum 1.3 cm.    5. Dilated left ventricular cavity size with moderately depressed  systolic function. Ejection fraction  37%.    6. Dilated right ventricular cavity size with mild-moderate depressed  RV systolic function. RV EF 40%.  Focal  dyskinesis of the basal inferior wall.    7. Compared to previous cardiac MRI 6/29/21 there is an increase in  left ventricular and right ventricular  cavity volumes and decrease in left ventricular and right ventricular  systolic function.    cMRI (6/29/21):  1. The findings of this study are suggestive of cardiac sarcoidosis.  Differential would also include but is  not limited to hypertrophic cardiomyopathy, arrhythmogenic right  ventricular cardiomyopathy.    2. Extensive confluent hyperenhancement the basal/mid inferior and  inferior septal walls with partial  sparing the left ventricular basal/mid subendocardial myocardium.  Subendocardial hyperenhancement of the  basal/mid inferior right ventricular myocardium.    3. T2 STIR: Increased signal intensity of the mid basal inferior  septum. Finding consistent with myocardial  inflammation/edema.    4. Asymmetric  septal hypertrophy. Maximal septal thickness mid  inferior septum 1.9 cm.    5. Normal left ventricular cavity size with moderately depressed  systolic function. Ejection fraction 42%.    6. Normal right ventricular cavity size with mild-moderate depressed  RV systolic function. RV EF 45%. Focal  dyskinesis of the basal inferior wall.    Echo (3/6/21):  1. The left ventricular systolic function is mildly decreased.  2. Multiple segmental abnormalities exist. See findings.  3. Aortic valve stenosis is not present.  4. There is global hypokinesis of the left ventricle with minor regional variations.         Impression/Plan:    Mr. Song is a 58M with a PMHx sig for stage C systolic HF/presumed NICM (sarcoid)/HFrEF and high grade AVB (Mobitz II) s/p dc-ppm from suspected sarcoidosis and atrial fibrillation on DOAC therapy who returns to the Advanced Heart Failure clinic for ongoing evaluation and management. At the current time he has functional class II symptoms and appears euvolemic on exam.     1) High grade AVB (Mobitz II) s/p dc-ppm secondary to sarcoidosis now s/p CRT-D  cMRI with LV dysfunction and findings consistent with cardiac sarcoidosis, including both scar and inflammation. Prednisone tapered and bactrim stopped. PET finally approved and has evidence concerning for extra-cardiac sarcoidosis.   -c/w prednisone 10 mg daily  -f/u with Dr. Goodrich from pulmonary to discuss starting MTX in addition to prednisone.     2) Stage C chronic systolic HF/presumed NICM (sarcoid)/HFrEF (LVEF 35-40-->20%; 7/2023) s/p CRT-D  -c/w entresto 24/26 mg bid, spironolactone 12.5 mg daily, jardiance 10 mg daily  -start metoprolol succinate 25 mg daily  -will repeat an echo 3 months post-upgrade (schedule for next month)  -if no improvement will consider cardiac cath and ischemic evaluation    3) pAF on DOAC therapy  Started after his recent hospitalization in July.   -c/w eliquis 5 mg bid  -f/u with EP    F/U: 3 months at  /[facility]    Eben,  Thank you for referring Mr. Song to the  Advanced Heart Failure Clinic. Please let me know if you have any questions.

## 2023-10-18 DIAGNOSIS — I50.20 SYSTOLIC HEART FAILURE, ACC/AHA STAGE C (MULTI): Primary | ICD-10-CM

## 2023-10-18 RX ORDER — SACUBITRIL AND VALSARTAN 24; 26 MG/1; MG/1
1 TABLET, FILM COATED ORAL 2 TIMES DAILY
Qty: 60 TABLET | Refills: 11 | Status: SHIPPED | OUTPATIENT
Start: 2023-10-18

## 2023-10-23 ENCOUNTER — LAB (OUTPATIENT)
Dept: LAB | Facility: LAB | Age: 58
End: 2023-10-23
Payer: COMMERCIAL

## 2023-10-23 DIAGNOSIS — Z00.00 ANNUAL PHYSICAL EXAM: ICD-10-CM

## 2023-10-23 LAB
25(OH)D3 SERPL-MCNC: 32 NG/ML (ref 30–100)
ALT SERPL W P-5'-P-CCNC: 12 U/L (ref 10–52)
ANION GAP SERPL CALC-SCNC: 10 MMOL/L (ref 10–20)
APPEARANCE UR: CLEAR
BILIRUB UR STRIP.AUTO-MCNC: NEGATIVE MG/DL
BUN SERPL-MCNC: 15 MG/DL (ref 6–23)
CALCIUM SERPL-MCNC: 9.5 MG/DL (ref 8.6–10.6)
CHLORIDE SERPL-SCNC: 107 MMOL/L (ref 98–107)
CHOLEST SERPL-MCNC: 277 MG/DL (ref 0–199)
CHOLESTEROL/HDL RATIO: 3.2
CO2 SERPL-SCNC: 28 MMOL/L (ref 21–32)
COLOR UR: ABNORMAL
CREAT SERPL-MCNC: 1.07 MG/DL (ref 0.5–1.3)
ERYTHROCYTE [DISTWIDTH] IN BLOOD BY AUTOMATED COUNT: 13.2 % (ref 11.5–14.5)
GFR SERPL CREATININE-BSD FRML MDRD: 80 ML/MIN/1.73M*2
GLUCOSE SERPL-MCNC: 93 MG/DL (ref 74–99)
GLUCOSE UR STRIP.AUTO-MCNC: ABNORMAL MG/DL
HCT VFR BLD AUTO: 48.7 % (ref 41–52)
HDLC SERPL-MCNC: 86.3 MG/DL
HGB BLD-MCNC: 15.4 G/DL (ref 13.5–17.5)
HOLD SPECIMEN: NORMAL
KETONES UR STRIP.AUTO-MCNC: NEGATIVE MG/DL
LDLC SERPL CALC-MCNC: 172 MG/DL
LEUKOCYTE ESTERASE UR QL STRIP.AUTO: NEGATIVE
MCH RBC QN AUTO: 29.9 PG (ref 26–34)
MCHC RBC AUTO-ENTMCNC: 31.6 G/DL (ref 32–36)
MCV RBC AUTO: 95 FL (ref 80–100)
NITRITE UR QL STRIP.AUTO: NEGATIVE
NON HDL CHOLESTEROL: 191 MG/DL (ref 0–149)
NRBC BLD-RTO: 0 /100 WBCS (ref 0–0)
PH UR STRIP.AUTO: 7 [PH]
PLATELET # BLD AUTO: 224 X10*3/UL (ref 150–450)
PMV BLD AUTO: 9.4 FL (ref 7.5–11.5)
POTASSIUM SERPL-SCNC: 4.4 MMOL/L (ref 3.5–5.3)
PROT UR STRIP.AUTO-MCNC: NEGATIVE MG/DL
PSA SERPL-MCNC: 0.32 NG/ML
RBC # BLD AUTO: 5.15 X10*6/UL (ref 4.5–5.9)
RBC # UR STRIP.AUTO: ABNORMAL /UL
RBC #/AREA URNS AUTO: NORMAL /HPF
SODIUM SERPL-SCNC: 141 MMOL/L (ref 136–145)
SP GR UR STRIP.AUTO: 1
TRIGL SERPL-MCNC: 96 MG/DL (ref 0–149)
TSH SERPL-ACNC: 0.45 MIU/L (ref 0.44–3.98)
UROBILINOGEN UR STRIP.AUTO-MCNC: <2 MG/DL
VIT B12 SERPL-MCNC: 510 PG/ML (ref 211–911)
VLDL: 19 MG/DL (ref 0–40)
WBC # BLD AUTO: 7.5 X10*3/UL (ref 4.4–11.3)
WBC #/AREA URNS AUTO: NORMAL /HPF

## 2023-10-23 PROCEDURE — 84460 ALANINE AMINO (ALT) (SGPT): CPT

## 2023-10-23 PROCEDURE — 80048 BASIC METABOLIC PNL TOTAL CA: CPT

## 2023-10-23 PROCEDURE — 80061 LIPID PANEL: CPT

## 2023-10-23 PROCEDURE — 36415 COLL VENOUS BLD VENIPUNCTURE: CPT

## 2023-10-23 PROCEDURE — 81001 URINALYSIS AUTO W/SCOPE: CPT

## 2023-10-23 PROCEDURE — 84443 ASSAY THYROID STIM HORMONE: CPT

## 2023-10-23 PROCEDURE — 82306 VITAMIN D 25 HYDROXY: CPT

## 2023-10-23 PROCEDURE — 84153 ASSAY OF PSA TOTAL: CPT

## 2023-10-23 PROCEDURE — 82607 VITAMIN B-12: CPT

## 2023-10-23 PROCEDURE — 85027 COMPLETE CBC AUTOMATED: CPT

## 2023-10-26 ENCOUNTER — OFFICE VISIT (OUTPATIENT)
Dept: PRIMARY CARE | Facility: CLINIC | Age: 58
End: 2023-10-26
Payer: COMMERCIAL

## 2023-10-26 VITALS
SYSTOLIC BLOOD PRESSURE: 102 MMHG | DIASTOLIC BLOOD PRESSURE: 72 MMHG | BODY MASS INDEX: 27.55 KG/M2 | HEART RATE: 78 BPM | RESPIRATION RATE: 16 BRPM | HEIGHT: 69 IN | WEIGHT: 186 LBS | TEMPERATURE: 98 F | OXYGEN SATURATION: 98 %

## 2023-10-26 DIAGNOSIS — E78.2 MIXED HYPERLIPIDEMIA: Primary | ICD-10-CM

## 2023-10-26 DIAGNOSIS — Z12.11 COLON CANCER SCREENING: ICD-10-CM

## 2023-10-26 PROCEDURE — 1036F TOBACCO NON-USER: CPT | Performed by: INTERNAL MEDICINE

## 2023-10-26 PROCEDURE — 99396 PREV VISIT EST AGE 40-64: CPT | Performed by: INTERNAL MEDICINE

## 2023-10-26 RX ORDER — ROSUVASTATIN CALCIUM 20 MG/1
20 TABLET, COATED ORAL DAILY
Qty: 90 TABLET | Refills: 2 | Status: SHIPPED | OUTPATIENT
Start: 2023-10-26 | End: 2024-10-25

## 2023-10-26 RX ORDER — FLUTICASONE PROPIONATE 110 UG/1
2 AEROSOL, METERED RESPIRATORY (INHALATION)
COMMUNITY
Start: 2006-05-08 | End: 2024-02-13 | Stop reason: WASHOUT

## 2023-10-26 NOTE — PROGRESS NOTES
"Subjective   Patient ID: Adams Song is a 58 y.o. male who presents for Annual Exam (CPE, form).  Patient comes in for a physical examination, doing well over - all with no particular complaints.   Also in for laboratory review and health maintenance update.  Updating family history as well.          Review of Systems   All other systems reviewed and are negative.      Objective   Physical Exam  Constitutional:       Appearance: Normal appearance.   HENT:      Head: Normocephalic and atraumatic.      Nose: Nose normal.   Cardiovascular:      Rate and Rhythm: Normal rate and regular rhythm.   Abdominal:      General: Abdomen is flat.      Palpations: Abdomen is soft.   Musculoskeletal:         General: Normal range of motion.      Cervical back: Normal range of motion.   Skin:     General: Skin is warm and dry.   Neurological:      Mental Status: He is alert.       /72   Pulse 78   Temp 36.7 °C (98 °F)   Resp 16   Ht 1.753 m (5' 9\")   Wt 84.4 kg (186 lb)   SpO2 98%   BMI 27.47 kg/m²         Assessment/Plan   Problem List Items Addressed This Visit    None         "

## 2023-11-06 DIAGNOSIS — D86.85 SARCOID MYOCARDITIS (HHS-HCC): ICD-10-CM

## 2023-11-06 RX ORDER — PREDNISONE 10 MG/1
10 TABLET ORAL DAILY
Qty: 30 TABLET | Refills: 1 | Status: SHIPPED | OUTPATIENT
Start: 2023-11-06 | End: 2024-01-15 | Stop reason: SDUPTHER

## 2023-11-14 ENCOUNTER — HOSPITAL ENCOUNTER (OUTPATIENT)
Dept: CARDIOLOGY | Facility: HOSPITAL | Age: 58
Discharge: HOME | End: 2023-11-14
Payer: COMMERCIAL

## 2023-11-14 DIAGNOSIS — Z95.810 PRESENCE OF AUTOMATIC CARDIOVERTER/DEFIBRILLATOR (AICD): Primary | ICD-10-CM

## 2023-11-14 DIAGNOSIS — I42.9 CARDIOMYOPATHY, UNSPECIFIED TYPE (MULTI): ICD-10-CM

## 2023-11-14 DIAGNOSIS — Z95.810 PRESENCE OF AUTOMATIC CARDIOVERTER/DEFIBRILLATOR (AICD): ICD-10-CM

## 2023-11-14 PROCEDURE — 93296 REM INTERROG EVL PM/IDS: CPT

## 2023-11-14 PROCEDURE — 93295 DEV INTERROG REMOTE 1/2/MLT: CPT | Performed by: INTERNAL MEDICINE

## 2023-11-19 LAB — NONINV COLON CA DNA+OCC BLD SCRN STL QL: NEGATIVE

## 2024-01-15 ENCOUNTER — HOSPITAL ENCOUNTER (OUTPATIENT)
Dept: CARDIOLOGY | Facility: HOSPITAL | Age: 59
Discharge: HOME | End: 2024-01-15
Payer: COMMERCIAL

## 2024-01-15 DIAGNOSIS — I50.20 SYSTOLIC HEART FAILURE, ACC/AHA STAGE C (MULTI): ICD-10-CM

## 2024-01-15 DIAGNOSIS — D86.85 SARCOID MYOCARDITIS (HHS-HCC): ICD-10-CM

## 2024-01-15 LAB
AORTIC VALVE MEAN GRADIENT: 3
AORTIC VALVE PEAK VELOCITY: 1.13
AV PEAK GRADIENT: 5.1
AVA (PEAK VEL): 1.41
AVA (VTI): 1.88
EJECTION FRACTION APICAL 4 CHAMBER: 31.4
EJECTION FRACTION: 27
LEFT ATRIUM VOLUME AREA LENGTH INDEX BSA: 18.8
LEFT VENTRICLE INTERNAL DIMENSION DIASTOLE: 6.28 (ref 3.5–6)
LEFT VENTRICULAR OUTFLOW TRACT DIAMETER: 2.1
MITRAL VALVE E/A RATIO: 0.58
MITRAL VALVE E/E' RATIO: 9.59
RIGHT VENTRICLE FREE WALL PEAK S': 12.2
RIGHT VENTRICLE PEAK SYSTOLIC PRESSURE: 19
TRICUSPID ANNULAR PLANE SYSTOLIC EXCURSION: 1.9

## 2024-01-15 PROCEDURE — 2500000004 HC RX 250 GENERAL PHARMACY W/ HCPCS (ALT 636 FOR OP/ED): Performed by: INTERNAL MEDICINE

## 2024-01-15 PROCEDURE — 93306 TTE W/DOPPLER COMPLETE: CPT | Performed by: INTERNAL MEDICINE

## 2024-01-15 PROCEDURE — 93306 TTE W/DOPPLER COMPLETE: CPT

## 2024-01-15 RX ADMIN — PERFLUTREN 3 ML OF DILUTION: 6.52 INJECTION, SUSPENSION INTRAVENOUS at 10:52

## 2024-01-15 NOTE — TELEPHONE ENCOUNTER
Patient called in can not get a hold of other providers office to refill him two medications, Advise?

## 2024-01-16 ENCOUNTER — APPOINTMENT (OUTPATIENT)
Dept: CARDIOLOGY | Facility: CLINIC | Age: 59
End: 2024-01-16
Payer: COMMERCIAL

## 2024-01-16 RX ORDER — PREDNISONE 10 MG/1
10 TABLET ORAL DAILY
Qty: 30 TABLET | Refills: 1 | Status: SHIPPED | OUTPATIENT
Start: 2024-01-16 | End: 2024-02-26

## 2024-01-17 ENCOUNTER — TELEPHONE (OUTPATIENT)
Dept: CARDIOLOGY | Facility: HOSPITAL | Age: 59
End: 2024-01-17
Payer: COMMERCIAL

## 2024-01-17 NOTE — TELEPHONE ENCOUNTER
CRM # [id number]  Owner: None  Status: Unresolved  Priority: High Created on: 01/15/2024 12:43 PM By: Nivia Wright     Primary Information    Source   Adams Song (Patient)    Subject   Adams Song (Patient)    Topic   Information Request - Trying to reach PCP      Communication   PATIENT TRYING TO REACH  FOR MEDICATION REFILLS PLEASE CALL WHEN YOU CAN THANK YOU

## 2024-01-17 NOTE — TELEPHONE ENCOUNTER
Copied from CRM #[id number]. Topic: Information Request - Trying to reach PCP  >> Donald 15, 2024 12:43 PM Nivia JUNIOR wrote:  PATIENT TRYING TO REACH  FOR MEDICATION REFILLS PLEASE CALL WHEN YOU CAN THANK YOU

## 2024-02-08 ENCOUNTER — TELEPHONE (OUTPATIENT)
Dept: PULMONOLOGY | Facility: HOSPITAL | Age: 59
End: 2024-02-08
Payer: COMMERCIAL

## 2024-02-08 NOTE — TELEPHONE ENCOUNTER
Left message notifying of cancelled 2/23/2024 pulmonary appointment and re-schedule 3/8/2024 9am Jacqui.

## 2024-02-13 ENCOUNTER — APPOINTMENT (OUTPATIENT)
Dept: CARDIOLOGY | Facility: CLINIC | Age: 59
End: 2024-02-13
Payer: COMMERCIAL

## 2024-02-13 ENCOUNTER — ANCILLARY PROCEDURE (OUTPATIENT)
Dept: CARDIOLOGY | Facility: CLINIC | Age: 59
End: 2024-02-13
Payer: COMMERCIAL

## 2024-02-13 ENCOUNTER — OFFICE VISIT (OUTPATIENT)
Dept: CARDIOLOGY | Facility: CLINIC | Age: 59
End: 2024-02-13
Payer: COMMERCIAL

## 2024-02-13 VITALS
WEIGHT: 198.4 LBS | BODY MASS INDEX: 29.38 KG/M2 | TEMPERATURE: 97.7 F | SYSTOLIC BLOOD PRESSURE: 82 MMHG | DIASTOLIC BLOOD PRESSURE: 60 MMHG | HEART RATE: 77 BPM | HEIGHT: 69 IN

## 2024-02-13 DIAGNOSIS — Z95.810 BIVENTRICULAR ICD (IMPLANTABLE CARDIOVERTER-DEFIBRILLATOR) IN PLACE: ICD-10-CM

## 2024-02-13 DIAGNOSIS — I42.9 IDIOPATHIC CARDIOMYOPATHY (MULTI): ICD-10-CM

## 2024-02-13 DIAGNOSIS — D86.85 CARDIAC SARCOIDOSIS (HHS-HCC): Primary | ICD-10-CM

## 2024-02-13 DIAGNOSIS — Z95.810 PRESENCE OF AUTOMATIC CARDIOVERTER/DEFIBRILLATOR (AICD): ICD-10-CM

## 2024-02-13 DIAGNOSIS — I44.1 MOBITZ TYPE II ATRIOVENTRICULAR BLOCK: ICD-10-CM

## 2024-02-13 PROCEDURE — 93290 INTERROG DEV EVAL ICPMS IP: CPT | Performed by: INTERNAL MEDICINE

## 2024-02-13 PROCEDURE — 93284 PRGRMG EVAL IMPLANTABLE DFB: CPT | Performed by: INTERNAL MEDICINE

## 2024-02-13 PROCEDURE — 1036F TOBACCO NON-USER: CPT | Performed by: INTERNAL MEDICINE

## 2024-02-13 PROCEDURE — 99213 OFFICE O/P EST LOW 20 MIN: CPT | Performed by: INTERNAL MEDICINE

## 2024-02-13 NOTE — PROGRESS NOTES
Subjective:  The patient is a 58-year-old white male who presents for biventricular ICD follow-up.  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  He was found to have a complete right bundle branch block and Mobitz type II second-degree AV block.  He underwent Medtronic DDDR pacemaker placement.  He was subsequently sent to Dr. Jh De La Vega and found by cardiac MRI to have probable cardiac sarcoidosis as the cause of his AV block.  He was treated with prednisone on and off.  He is scheduled to see a pulmonologist for consideration of an alternative agent, though this may require a lung biopsy before hand, a procedure in which the patient is not particularly interested.    By pacemaker interrogations, the patient had frequent runs of nonsustained ventricular tachycardia, some of them looking rather malignant.  He ultimately dropped his LVEF down to 20-25% by July 2023.  On 7/21/2023, he went to [facility] and underwent a pacemaker upgrade to a biventricular DDDR ICD, with removal of his existing right ventricular pacing lead.  Even with resynchronization therapy, his LVEF in January 2024 was only at 25%.  He has had paroxysmal atrial fibrillation as well, and is on Eliquis anticoagulation for this.    The patient hopes to work 2.5 more years before he is eligible for full prison.    Current Outpatient Medications   Medication Sig    apixaban (Eliquis) 5 mg tablet Take 1 tablet (5 mg) by mouth 2 times a day.    cholecalciferol (Vitamin D-3) 5,000 Units tablet Take 1 tablet (5,000 Units) by mouth once daily.    Entresto 24-26 mg tablet Take 1 tablet by mouth 2 times a day.    fluticasone (Flovent) 110 mcg/actuation inhaler Inhale 2 puffs 2 times a day.    Jardiance 10 mg Take 1 tablet (10 mg) by mouth once daily.    metoprolol succinate XL (Toprol-XL) 25 mg 24 hr tablet Take 1 tablet (25 mg) by mouth once daily. Do not crush  "or chew.    MULTIVITAMIN WITH MINERALS ORAL Take 1 tablet by mouth once daily.    predniSONE (Deltasone) 10 mg tablet Take 1 tablet (10 mg) by mouth once daily.    predniSONE 10 mg tablets,dose pack Take 1 tablet by mouth once daily.    rosuvastatin (Crestor) 20 mg tablet Take 1 tablet (20 mg) by mouth once daily.    spironolactone (Aldactone) 25 mg tablet Take 1 half tablet by mouth once daily.    sulfamethoxazole-trimethoprim (Bactrim DS) 800-160 mg tablet Take 1 tablet by mouth once daily.    valACYclovir (Valtrex) 500 mg tablet Take 1 tablet (500 mg) by mouth once daily.     Allergies:  Patient has no known allergies.     Patient Active Problem List   Diagnosis    Adult situational stress disorder    Biventricular ICD (implantable cardioverter-defibrillator) in place    Cardiac pacemaker    Cardiac sarcoidosis    Pulmonary sarcoidosis (CMS/HCC)    Complete heart block (CMS/HCC)    Sarcoidosis    Partial AV block    Hyperlipidemia    Nonsustained ventricular tachycardia (CMS/HCC)    Paroxysmal atrial fibrillation with rapid ventricular response (CMS/HCC)    Systolic heart failure, ACC/AHA stage C (CMS/HCC)    Systolic heart failure, stage B (CMS/HCC)    Vitamin D deficiency     Past Surgical History:   Procedure Laterality Date    OTHER SURGICAL HISTORY  10/19/2021    Pacemaker insertion     Objective:  Vitals:    02/13/24 1641   BP: 82/60   Pulse: 77   Temp: 36.5 °C (97.7 °F)     Height:     1.753 m (5' 9\")  Vitals:    02/13/24 1641   Weight: 90 kg (198 lb 6.4 oz)     Exam:  Gen: Middle-age gentleman in no distress.  HEENT: No scleral icterus.  Neck: No jugular venous distention or thyromegaly.  Left subclavian ICD pocket: Residual erythema over broad ICD scar, but no evidence of infection  Lungs: Clear to auscultation, with no wheezes or rales.  Heart: Regular rhythm with diffuse apical impulse but no extrasystoles, murmurs, or gallops.  Abdomen: Benign, with no organomegaly or masses.  Extremities: Intact " distal pulses; no edema.  Neuro: No focal neurologic abnormalities.  Skin: No cutaneous lesions.    Device Check:  A Medtronic biventricular DDDR ICD check was done.  The unit is programmed DDDR between 70 bpm and 150 bpm, which provides 66% atrial pacing and nearly 97% CRT in the ventricles.  The LV pacing vector was changed to LV1-LV3, which had a lower threshold and will allow a few more years of battery longevity.  The atrial arrhythmia burden was less than 0.1%.  There were a few spells of nonsustained VT lasting no more than 2 seconds at a time.  The device battery longevity is estimated at 6.3 years.    Impressions:  1.  Cardiac sarcoidosis as cause of AV block, nonsustained VT, and left ventricular systolic dysfunction.  He is on prednisone therapy per Dr. Jh De La Vega.  2.  Status post Medtronic DDDR pacemaker placement in March 2021 to treat AV block, with upgrade to biventricular DDDR ICD in July 2023 for protection against sudden death because of concomitant ventricular arrhythmias.  The ICD function is normal.  3.  Left ventricular systolic dysfunction with LVEF 25%.  The patient does not have significant heart failure presently, but might eventually be a candidate for heart transplantation if his ventricular function further deteriorates.  4.  Paroxysmal atrial fibrillation, with low burden of this rhythm.  He has a [id number] score of just 1 (CHF), actually making his Eliquis anticoagulation optional, in my opinion.  In the absence of bleeding, however, we will continue this presently.    Recommendations:  1.  The patient will continue his current medical regimen.  2.  He may start a new treatment for his cardiac sarcoidosis, depending upon recommendations from a pulmonologist whom he will see in the near future.  3.  His ICD will be checked remotely every 3 months and he will see me in the office on an annual basis.  4.  He will notify me in the event of any ICD shocks.      Leodan VENCES  MD Michael

## 2024-02-19 ENCOUNTER — APPOINTMENT (OUTPATIENT)
Dept: CARDIOLOGY | Facility: CLINIC | Age: 59
End: 2024-02-19
Payer: COMMERCIAL

## 2024-02-23 ENCOUNTER — APPOINTMENT (OUTPATIENT)
Dept: PULMONOLOGY | Facility: HOSPITAL | Age: 59
End: 2024-02-23
Payer: COMMERCIAL

## 2024-02-25 DIAGNOSIS — D86.85 SARCOID MYOCARDITIS (HHS-HCC): ICD-10-CM

## 2024-02-26 RX ORDER — PREDNISONE 10 MG/1
10 TABLET ORAL DAILY
Qty: 30 TABLET | Refills: 1 | Status: SHIPPED | OUTPATIENT
Start: 2024-02-26 | End: 2024-05-06

## 2024-03-04 DIAGNOSIS — B00.9 HERPES: Primary | ICD-10-CM

## 2024-03-04 RX ORDER — VALACYCLOVIR HYDROCHLORIDE 500 MG/1
500 TABLET, FILM COATED ORAL DAILY PRN
COMMUNITY
End: 2024-03-04 | Stop reason: SDUPTHER

## 2024-03-05 RX ORDER — VALACYCLOVIR HYDROCHLORIDE 500 MG/1
500 TABLET, FILM COATED ORAL DAILY PRN
Qty: 30 TABLET | Refills: 0 | Status: SHIPPED | OUTPATIENT
Start: 2024-03-05

## 2024-03-08 ENCOUNTER — APPOINTMENT (OUTPATIENT)
Dept: PULMONOLOGY | Facility: HOSPITAL | Age: 59
End: 2024-03-08
Payer: COMMERCIAL

## 2024-03-08 DIAGNOSIS — D86.0 PULMONARY SARCOIDOSIS (MULTI): Primary | ICD-10-CM

## 2024-03-08 RX ORDER — FOLIC ACID 1 MG/1
1 TABLET ORAL ONCE
Status: DISCONTINUED | OUTPATIENT
Start: 2024-03-08 | End: 2024-04-01

## 2024-03-08 RX ORDER — METHOTREXATE 2.5 MG/1
TABLET ORAL
Qty: 12 TABLET | Refills: 0 | Status: SHIPPED | OUTPATIENT
Start: 2024-03-08 | End: 2024-04-16 | Stop reason: SDUPTHER

## 2024-03-14 ENCOUNTER — LAB (OUTPATIENT)
Dept: LAB | Facility: LAB | Age: 59
End: 2024-03-14
Payer: COMMERCIAL

## 2024-03-14 DIAGNOSIS — D86.0 PULMONARY SARCOIDOSIS (MULTI): ICD-10-CM

## 2024-03-14 LAB
ALBUMIN SERPL BCP-MCNC: 3.8 G/DL (ref 3.4–5)
ALP SERPL-CCNC: 47 U/L (ref 33–120)
ALT SERPL W P-5'-P-CCNC: 12 U/L (ref 10–52)
AST SERPL W P-5'-P-CCNC: 12 U/L (ref 9–39)
BASOPHILS # BLD AUTO: 0.05 X10*3/UL (ref 0–0.1)
BASOPHILS NFR BLD AUTO: 0.7 %
BILIRUB DIRECT SERPL-MCNC: 0.1 MG/DL (ref 0–0.3)
BILIRUB SERPL-MCNC: 0.3 MG/DL (ref 0–1.2)
EOSINOPHIL # BLD AUTO: 0.27 X10*3/UL (ref 0–0.7)
EOSINOPHIL NFR BLD AUTO: 3.7 %
ERYTHROCYTE [DISTWIDTH] IN BLOOD BY AUTOMATED COUNT: 13.8 % (ref 11.5–14.5)
HCT VFR BLD AUTO: 44.9 % (ref 41–52)
HGB BLD-MCNC: 14.6 G/DL (ref 13.5–17.5)
IMM GRANULOCYTES # BLD AUTO: 0.05 X10*3/UL (ref 0–0.7)
IMM GRANULOCYTES NFR BLD AUTO: 0.7 % (ref 0–0.9)
LYMPHOCYTES # BLD AUTO: 2.17 X10*3/UL (ref 1.2–4.8)
LYMPHOCYTES NFR BLD AUTO: 29.9 %
MCH RBC QN AUTO: 30.4 PG (ref 26–34)
MCHC RBC AUTO-ENTMCNC: 32.5 G/DL (ref 32–36)
MCV RBC AUTO: 94 FL (ref 80–100)
MONOCYTES # BLD AUTO: 0.63 X10*3/UL (ref 0.1–1)
MONOCYTES NFR BLD AUTO: 8.7 %
NEUTROPHILS # BLD AUTO: 4.09 X10*3/UL (ref 1.2–7.7)
NEUTROPHILS NFR BLD AUTO: 56.3 %
NRBC BLD-RTO: 0 /100 WBCS (ref 0–0)
PLATELET # BLD AUTO: 210 X10*3/UL (ref 150–450)
PROT SERPL-MCNC: 5.9 G/DL (ref 6.4–8.2)
RBC # BLD AUTO: 4.8 X10*6/UL (ref 4.5–5.9)
WBC # BLD AUTO: 7.3 X10*3/UL (ref 4.4–11.3)

## 2024-03-14 PROCEDURE — 36415 COLL VENOUS BLD VENIPUNCTURE: CPT

## 2024-03-14 PROCEDURE — 85025 COMPLETE CBC W/AUTO DIFF WBC: CPT

## 2024-03-14 PROCEDURE — 80076 HEPATIC FUNCTION PANEL: CPT

## 2024-04-01 ENCOUNTER — OFFICE VISIT (OUTPATIENT)
Dept: CARDIOLOGY | Facility: CLINIC | Age: 59
End: 2024-04-01
Payer: COMMERCIAL

## 2024-04-01 VITALS
WEIGHT: 202 LBS | DIASTOLIC BLOOD PRESSURE: 69 MMHG | HEART RATE: 83 BPM | SYSTOLIC BLOOD PRESSURE: 103 MMHG | OXYGEN SATURATION: 98 % | HEIGHT: 69 IN | BODY MASS INDEX: 29.92 KG/M2

## 2024-04-01 DIAGNOSIS — I50.20 SYSTOLIC HEART FAILURE, ACC/AHA STAGE C (MULTI): ICD-10-CM

## 2024-04-01 DIAGNOSIS — I48.0 PAROXYSMAL ATRIAL FIBRILLATION WITH RAPID VENTRICULAR RESPONSE (MULTI): ICD-10-CM

## 2024-04-01 DIAGNOSIS — D86.85 CARDIAC SARCOIDOSIS (HHS-HCC): Primary | ICD-10-CM

## 2024-04-01 PROCEDURE — 1036F TOBACCO NON-USER: CPT | Performed by: INTERNAL MEDICINE

## 2024-04-01 PROCEDURE — 99214 OFFICE O/P EST MOD 30 MIN: CPT | Performed by: INTERNAL MEDICINE

## 2024-04-01 NOTE — PROGRESS NOTES
[facility] Failure [facility]  Primary Care Physician: Adrian Li MD  Referring Provider/Cardiologist: Michael     Date of Visit: 2024  4:20 PM EDT  Location of visit: [address]     HPI:   Mr. Song is a 58M with a PMHx sig for stage C systolic HF/presumed NICM (sarcoid)/HFrEF and high grade AVB (Mobitz II) s/p dc-ppm from suspected sarcoidosis who returns to the Advanced Heart Failure clinic for ongoing evaluation and management.    Interval hx:   Patient has complaints of occasional chest pain. He rates this a 4/10. This will last for a few minutes and resolve on its own. Patient denies palpitations, shortness of breath. Patient has complaints of dyspnea on exertion. Patient denies orthopnea, negative PND. No edema noted in BLE. Patient denies headaches, dizziness or recent falls.     He was recently prescribed methotrexate but hasn't picked it up yet.       Hospitalizations: Denies  Medication adherence: Patient states yes       SocHx:   , lives with long time girlfriend in [place]. Works full time as bread /salesman.  Tobacco: None past or current  EtOH: Socially at special events, no routine use  Recreational: None past or current    FamHx:  Mother: HTN, Afib, [place], HFpEF, living age 81/82  Father: CAD s/p CABG x 4 in 70's, living age 75  Oldest Brother: CAD, recent cath, needs PCI, living age 60  Paternal grandmother:  of heart problems  Paternal Uncles x 3:  of MI's  Maternal grandfather: Pacemaker  Paternal grandmother: CVA,  from MI later       Current Outpatient Medications   Medication Sig Dispense Refill    apixaban (Eliquis) 5 mg tablet Take 1 tablet (5 mg) by mouth 2 times a day. 180 tablet 3    cholecalciferol (Vitamin D-3) 5,000 Units tablet Take 1 tablet (5,000 Units) by mouth once daily.      Entresto 24-26 mg tablet Take 1 tablet by mouth 2 times a day. 60 tablet 11    Jardiance 10 mg Take 1 tablet (10 mg) by  "mouth once daily.      methotrexate (Trexall) 2.5 mg tablet Take 1 tablet (2.5 mg total) by mouth 1 (one) time per week for 7 days, THEN 2 tablets (5 mg total) 1 (one) time per week for 7 days, THEN 3 tablets (7.5 mg total) 1 (one) time per week for 7 days, THEN 5 tablets (12.5 mg total) 1 (one) time per week for 7 days, THEN 6 tablets (15 mg total) 1 (one) time per week. Follow directions carefully, and ask to explain any part you do not understand. Take exactly as directed.. 12 tablet 0    metoprolol succinate XL (Toprol-XL) 25 mg 24 hr tablet Take 1 tablet (25 mg) by mouth once daily. Do not crush or chew. 30 tablet 11    predniSONE (Deltasone) 10 mg tablet Take 1 tablet (10 mg) by mouth once daily. 30 tablet 1    rosuvastatin (Crestor) 20 mg tablet Take 1 tablet (20 mg) by mouth once daily. 90 tablet 2    spironolactone (Aldactone) 25 mg tablet Take 1 half tablet by mouth once daily.      valACYclovir (Valtrex) 500 mg tablet Take 1 tablet (500 mg) by mouth once daily as needed (As needed for flare ups). 30 tablet 0     Current Facility-Administered Medications   Medication Dose Route Frequency Provider Last Rate Last Admin    folic acid (Folvite) tablet 1 mg  1 mg oral Once Kathe Goodrich MD           Allergies   Allergen Reactions    House Dust Runny nose     asthma         Visit Vitals  /69 (BP Location: Right arm, Patient Position: Sitting)   Pulse 83   Ht 1.753 m (5' 9\")   Wt 91.6 kg (202 lb)   SpO2 98%   BMI 29.83 kg/m²   Smoking Status Never   BSA 2.11 m²        Physical Exam:  On exam Mr. Song appears his stated age, is alert and oriented x3, and in no acute distress. His sclera are anicteric and his oropharynx has moist mucous membranes. His neck is supple and without thyromegaly. The JVP is ~5 cm of water above the right atrium. His cardiac exam has regular rhythm, normal S1, S2. No S3/4. There are no murmurs. His lungs are clear to auscultation bilaterally and there is no dullness to percussion. " His abdomen is soft, nontender with normoactive bowel sounds. There is no HJR. The extremities are warm and without edema. The skin is dry. There is no rash present. The distal pulses are 2+ in all four extremities. His mood and affect are appropriate for todays encounter.      Cardiac Labs/Diagnostics:    Lab Results   Component Value Date    CREATININE 1.07 10/23/2023    BUN 15 10/23/2023     10/23/2023    K 4.4 10/23/2023     10/23/2023    CO2 28 10/23/2023        Recent Labs     10/23/23  1530 12/21/22  0920 09/15/21  1104 03/07/21  0647   CHOL 277* 188 140 154   LDLF  --  119* 77 98   LDLCALC 172*  --   --   --    HDL 86.3 56.1 53.0 38.0*   TRIG 96 64 51 92       Recent Labs     07/19/23  0850 02/25/22  1126   * 151*     Echo (1/15/24):  1. Left ventricular systolic function is severely decreased with a 25% estimated ejection fraction.   2. Multiple segmental abnormalities exist. See findings.   3. Abnormal septal motion consistent with RV pacemaker.   4. Spectral Doppler shows an impaired relaxation pattern of left ventricular diastolic filling.   5. The left atrium is moderately dilated.   6. RVSP within normal limits.   7. Compared to 7/2023 no change is seen.   8. There are multiple wall motion abnormalities.    Echo (7/10/23):  1. Left ventricular systolic function is severely decreased with a 20-25% estimated ejection fraction. 3D with contrat ej fx 21%, posterior akinenitic scar, severe dilation, no thrombus  2. Left ventricular cavity size is severely dilated.  3. There are multiple wall motion abnormalities.  4. Multiple segmental abnormalities exist. See findings.  5. There is low normal right ventricular systolic function.  6. Aortic valve sclerosis.  7. Moderate mitral valve regurgitation.    ECG (3/28/23):  A-V paced (HR 74)    PET scan (3/2/23):  1. Assuming appropriate fasting, there is increased metabolic activity along the basal 2/3 of the anterior, lateral, inferior, and  septal left ventricular wall consistent with an inflammatory process such as myocarditis or sarcoidosis.  2. Perfusion defect in the inferior 1/3 of the inferior-septal wall, and a small territory of perfusion defect in the inferior apex, suggestive of myocardial scarring.  3. Cardiomegaly with decreased ejection fraction at 34% (normal above 45%).  4. Hypermetabolic left hilar, left paratracheal, and right paratracheal lymph nodes are consistent with sarcoidosis involvement.    cMRI (4/5/22):  1. Biventricular Cardiomyopathy.  Differential includes but is not limited to cardiac sarcoidosis, arrhythmogenic rightventricular cardiomyopathy, hypertrophic cardiomyopathy. The findings of this study are consistent with a single major CMR criteria for the diagnosis of arrhythmogenic right ventricular cardiomyopathy per the 2010 Task Force criteria(focal RV dyskinesis and RV EF </= 40%).  2. Extensive confluent hyperenhancement the basal/mid inferior and inferior septal walls with partial sparing the left ventricular basal/mid subendocardial myocardium. Subendocardial hyperenhancement of the basal/mid inferior and lateral right ventricular myocardium.  3. T2 STIR: Increased signal intensity of the mid basal inferior septum and lateral RV myocardium. Finding consistent with myocardial inflammation/edema.  4. Asymmetric septal hypertrophy. Maximal septal thickness mid inferior septum 1.3 cm.  5. Dilated left ventricular cavity size with moderately depressed systolic function. Ejection fraction 37%.  6. Dilated right ventricular cavity size with mild-moderate depressed RV systolic function. RV EF 40%. Focal dyskinesis of the basal inferior wall.  7. Compared to previous cardiac MRI 6/29/21 there is an increase in left ventricular and right ventricular cavity volumes and decrease in left ventricular and right ventricular systolic function.    cMRI (6/29/21):  1. The findings of this study are suggestive of cardiac sarcoidosis.  Differential would also include but is not limited to hypertrophic cardiomyopathy, arrhythmogenic right ventricular cardiomyopathy.  2. Extensive confluent hyperenhancement the basal/mid inferior and inferior septal walls with partial sparing the left ventricular basal/mid subendocardial myocardium. Subendocardial hyperenhancement of the basal/mid inferior right ventricular myocardium  3. T2 STIR: Increased signal intensity of the mid basal inferior septum. Finding consistent with myocardial inflammation/edema.  4. Asymmetric septal hypertrophy. Maximal septal thickness mid inferior septum 1.9 cm.  5. Normal left ventricular cavity size with moderately depressed systolic function. Ejection fraction 42%.  6. Normal right ventricular cavity size with mild-moderate depressed RV systolic function. RV EF 45%. Focal dyskinesis of the basal inferior wall.    Echo (3/6/21):  1. The left ventricular systolic function is mildly decreased.  2. Multiple segmental abnormalities exist. See findings.  3. Aortic valve stenosis is not present.  4. There is global hypokinesis of the left ventricle with minor regional variations.      Impression/Plan:  Mr. Song is a 58M with a PMHx sig for stage C systolic HF/presumed NICM (sarcoid)/HFrEF and high grade AVB (Mobitz II) s/p dc-ppm from suspected sarcoidosis who returns to the Advanced Heart Failure clinic for ongoing evaluation and management. At the current time he has functional class II symptoms and appears euvolemic on exam.     1) High grade AVB (Mobitz II) s/p dc-ppm secondary to sarcoidosis now s/p CRT-D  cMRI with LV dysfunction and findings consistent with cardiac sarcoidosis, including both scar and inflammation. Prednisone tapered and bactrim stopped. PET finally approved with evidence concerning for extra-cardiac sarcoidosis. Recently see by pulmonary and planned to start methotrexate.   -c/w prednisone 10 mg daily  -start methotrexate and f/u with Dr. Goodrich    2) Stage C  chronic systolic HF/presumed NICM (sarcoid)/HFrEF (LVEF 35-40-->25%; 1/2024) s/p CRT-D  Repeat echo with persistent LV dysfunction.   -c/w metoprolol succinate 25 mg daily, entresto 24/26 mg bid, spironolactone 12.5 mg daily, jardiance 10 mg daily  -if no improvement after treatment of sarcoidosis, will an ischemic evaluation (cath vs CCTA)    3) pAF on DOAC therapy  -c/w metoprolol, eliquis 5 mg bid  -f/u with EP    4) Dyslipidemia   (10/2023)  -c/w rosuvastatin 20 mg daily      F/U: 6 months at /[facility]    Eben,  Thank you for referring Mr. Song to the  Advanced Heart Failure Clinic. Please let me know if you have any questions.      ____________________________________________________________  Jh De La Vega DO  Section of Advanced Heart Failure and Cardiac Transplantation  Division of Cardiovascular Medicine  [facility]  [facility]

## 2024-04-01 NOTE — PATIENT INSTRUCTIONS
It was a pleasure seeing you today. Please contact myself or my team with any questions.     To reach Dr. De La Vega' office please call [phone] (Monalisa).   Fax: [phone]   To schedule an appointment call [phone]     If you have any questions or need cardiac medication refills, please call the Heart Failure office at [phone], option 6. You may also contact the  Heart Failure Nursing team via email at [email or web address] (Please include your name and date of birth).        1) Continue your current medications  2) Follow up in 6 months at /[facility]  3) Call Dr. Goodrich's office about the methotrexate

## 2024-04-09 ENCOUNTER — TELEPHONE (OUTPATIENT)
Dept: CARDIOLOGY | Facility: HOSPITAL | Age: 59
End: 2024-04-09
Payer: COMMERCIAL

## 2024-04-16 DIAGNOSIS — D86.0 PULMONARY SARCOIDOSIS (MULTI): ICD-10-CM

## 2024-04-17 DIAGNOSIS — D86.0 PULMONARY SARCOIDOSIS (MULTI): ICD-10-CM

## 2024-04-17 RX ORDER — METHOTREXATE 2.5 MG/1
TABLET ORAL
Qty: 12 TABLET | Refills: 0 | Status: SHIPPED | OUTPATIENT
Start: 2024-04-21 | End: 2024-04-22

## 2024-04-22 RX ORDER — METHOTREXATE 2.5 MG/1
TABLET ORAL
Qty: 12 TABLET | Refills: 0 | Status: SHIPPED | OUTPATIENT
Start: 2024-04-28 | End: 2024-11-21

## 2024-04-22 NOTE — TELEPHONE ENCOUNTER
[facility] left a message with our office regarding a medication that was called in from the [place] office. Methotrextate.  They are questioning the directions as 2 of the same medication was called in.

## 2024-05-05 DIAGNOSIS — D86.85 SARCOID MYOCARDITIS (HHS-HCC): ICD-10-CM

## 2024-05-06 RX ORDER — PREDNISONE 10 MG/1
10 TABLET ORAL DAILY
Qty: 30 TABLET | Refills: 1 | Status: SHIPPED | OUTPATIENT
Start: 2024-05-06

## 2024-05-20 ENCOUNTER — HOSPITAL ENCOUNTER (OUTPATIENT)
Dept: CARDIOLOGY | Facility: HOSPITAL | Age: 59
Discharge: HOME | End: 2024-05-20
Payer: COMMERCIAL

## 2024-05-20 DIAGNOSIS — Z95.810 PRESENCE OF AUTOMATIC CARDIOVERTER/DEFIBRILLATOR (AICD): ICD-10-CM

## 2024-05-20 DIAGNOSIS — I42.9 CARDIOMYOPATHY, UNSPECIFIED TYPE (MULTI): ICD-10-CM

## 2024-05-20 PROCEDURE — 93295 DEV INTERROG REMOTE 1/2/MLT: CPT | Performed by: INTERNAL MEDICINE

## 2024-05-20 PROCEDURE — 93296 REM INTERROG EVL PM/IDS: CPT

## 2024-05-31 ENCOUNTER — LAB (OUTPATIENT)
Dept: LAB | Facility: LAB | Age: 59
End: 2024-05-31
Payer: COMMERCIAL

## 2024-05-31 DIAGNOSIS — D86.0 PULMONARY SARCOIDOSIS (MULTI): ICD-10-CM

## 2024-05-31 LAB
ALBUMIN SERPL BCP-MCNC: 3.9 G/DL (ref 3.4–5)
ALP SERPL-CCNC: 40 U/L (ref 33–120)
ALT SERPL W P-5'-P-CCNC: 12 U/L (ref 10–52)
AST SERPL W P-5'-P-CCNC: 11 U/L (ref 9–39)
BASOPHILS # BLD AUTO: 0.06 X10*3/UL (ref 0–0.1)
BASOPHILS NFR BLD AUTO: 0.8 %
BILIRUB DIRECT SERPL-MCNC: 0.1 MG/DL (ref 0–0.3)
BILIRUB SERPL-MCNC: 0.4 MG/DL (ref 0–1.2)
EOSINOPHIL # BLD AUTO: 0.25 X10*3/UL (ref 0–0.7)
EOSINOPHIL NFR BLD AUTO: 3.1 %
ERYTHROCYTE [DISTWIDTH] IN BLOOD BY AUTOMATED COUNT: 14.1 % (ref 11.5–14.5)
HCT VFR BLD AUTO: 43.6 % (ref 41–52)
HGB BLD-MCNC: 14.2 G/DL (ref 13.5–17.5)
IMM GRANULOCYTES # BLD AUTO: 0.05 X10*3/UL (ref 0–0.7)
IMM GRANULOCYTES NFR BLD AUTO: 0.6 % (ref 0–0.9)
LYMPHOCYTES # BLD AUTO: 2.89 X10*3/UL (ref 1.2–4.8)
LYMPHOCYTES NFR BLD AUTO: 36.3 %
MCH RBC QN AUTO: 30.5 PG (ref 26–34)
MCHC RBC AUTO-ENTMCNC: 32.6 G/DL (ref 32–36)
MCV RBC AUTO: 94 FL (ref 80–100)
MONOCYTES # BLD AUTO: 0.63 X10*3/UL (ref 0.1–1)
MONOCYTES NFR BLD AUTO: 7.9 %
NEUTROPHILS # BLD AUTO: 4.09 X10*3/UL (ref 1.2–7.7)
NEUTROPHILS NFR BLD AUTO: 51.3 %
NRBC BLD-RTO: 0 /100 WBCS (ref 0–0)
PLATELET # BLD AUTO: 203 X10*3/UL (ref 150–450)
PROT SERPL-MCNC: 5.9 G/DL (ref 6.4–8.2)
RBC # BLD AUTO: 4.65 X10*6/UL (ref 4.5–5.9)
WBC # BLD AUTO: 8 X10*3/UL (ref 4.4–11.3)

## 2024-05-31 PROCEDURE — 85025 COMPLETE CBC W/AUTO DIFF WBC: CPT

## 2024-05-31 PROCEDURE — 36415 COLL VENOUS BLD VENIPUNCTURE: CPT

## 2024-05-31 PROCEDURE — 80076 HEPATIC FUNCTION PANEL: CPT

## 2024-07-02 DIAGNOSIS — D86.85 SARCOID MYOCARDITIS (HHS-HCC): ICD-10-CM

## 2024-07-02 DIAGNOSIS — E78.2 MIXED HYPERLIPIDEMIA: ICD-10-CM

## 2024-07-02 RX ORDER — ROSUVASTATIN CALCIUM 20 MG/1
20 TABLET, COATED ORAL DAILY
Qty: 90 TABLET | Refills: 2 | Status: SHIPPED | OUTPATIENT
Start: 2024-07-02 | End: 2025-07-02

## 2024-07-02 RX ORDER — PREDNISONE 10 MG/1
10 TABLET ORAL DAILY
Qty: 30 TABLET | Refills: 1 | Status: SHIPPED | OUTPATIENT
Start: 2024-07-02 | End: 2024-07-05 | Stop reason: SDUPTHER

## 2024-07-05 ENCOUNTER — TELEMEDICINE (OUTPATIENT)
Dept: PULMONOLOGY | Facility: HOSPITAL | Age: 59
End: 2024-07-05
Payer: COMMERCIAL

## 2024-07-05 ENCOUNTER — LAB (OUTPATIENT)
Dept: LAB | Facility: LAB | Age: 59
End: 2024-07-05
Payer: COMMERCIAL

## 2024-07-05 DIAGNOSIS — Z79.60 LONG-TERM USE OF IMMUNOSUPPRESSANT MEDICATION: Primary | ICD-10-CM

## 2024-07-05 DIAGNOSIS — D86.85 SARCOID MYOCARDITIS (HHS-HCC): ICD-10-CM

## 2024-07-05 DIAGNOSIS — D86.0 PULMONARY SARCOIDOSIS (MULTI): ICD-10-CM

## 2024-07-05 LAB
ALBUMIN SERPL BCP-MCNC: 4.1 G/DL (ref 3.4–5)
ALP SERPL-CCNC: 42 U/L (ref 33–120)
ALT SERPL W P-5'-P-CCNC: 19 U/L (ref 10–52)
AST SERPL W P-5'-P-CCNC: 14 U/L (ref 9–39)
BASOPHILS # BLD AUTO: 0.03 X10*3/UL (ref 0–0.1)
BASOPHILS NFR BLD AUTO: 0.3 %
BILIRUB DIRECT SERPL-MCNC: 0.1 MG/DL (ref 0–0.3)
BILIRUB SERPL-MCNC: 0.5 MG/DL (ref 0–1.2)
EOSINOPHIL # BLD AUTO: 0.17 X10*3/UL (ref 0–0.7)
EOSINOPHIL NFR BLD AUTO: 1.8 %
ERYTHROCYTE [DISTWIDTH] IN BLOOD BY AUTOMATED COUNT: 14.6 % (ref 11.5–14.5)
HCT VFR BLD AUTO: 45.3 % (ref 41–52)
HGB BLD-MCNC: 15.1 G/DL (ref 13.5–17.5)
IMM GRANULOCYTES # BLD AUTO: 0.06 X10*3/UL (ref 0–0.7)
IMM GRANULOCYTES NFR BLD AUTO: 0.6 % (ref 0–0.9)
LYMPHOCYTES # BLD AUTO: 2.37 X10*3/UL (ref 1.2–4.8)
LYMPHOCYTES NFR BLD AUTO: 25.3 %
MCH RBC QN AUTO: 31.1 PG (ref 26–34)
MCHC RBC AUTO-ENTMCNC: 33.3 G/DL (ref 32–36)
MCV RBC AUTO: 93 FL (ref 80–100)
MONOCYTES # BLD AUTO: 0.86 X10*3/UL (ref 0.1–1)
MONOCYTES NFR BLD AUTO: 9.2 %
NEUTROPHILS # BLD AUTO: 5.86 X10*3/UL (ref 1.2–7.7)
NEUTROPHILS NFR BLD AUTO: 62.8 %
NRBC BLD-RTO: 0 /100 WBCS (ref 0–0)
PLATELET # BLD AUTO: 205 X10*3/UL (ref 150–450)
PROT SERPL-MCNC: 6.2 G/DL (ref 6.4–8.2)
RBC # BLD AUTO: 4.85 X10*6/UL (ref 4.5–5.9)
WBC # BLD AUTO: 9.4 X10*3/UL (ref 4.4–11.3)

## 2024-07-05 PROCEDURE — 36415 COLL VENOUS BLD VENIPUNCTURE: CPT

## 2024-07-05 PROCEDURE — 99214 OFFICE O/P EST MOD 30 MIN: CPT | Mod: 95 | Performed by: INTERNAL MEDICINE

## 2024-07-05 PROCEDURE — 99214 OFFICE O/P EST MOD 30 MIN: CPT | Performed by: INTERNAL MEDICINE

## 2024-07-05 PROCEDURE — 80076 HEPATIC FUNCTION PANEL: CPT

## 2024-07-05 PROCEDURE — 85025 COMPLETE CBC W/AUTO DIFF WBC: CPT

## 2024-07-05 RX ORDER — PREDNISONE 10 MG/1
10 TABLET ORAL DAILY
Qty: 30 TABLET | Refills: 1 | Status: SHIPPED | OUTPATIENT
Start: 2024-07-05

## 2024-07-05 NOTE — PATIENT INSTRUCTIONS
Adams Song it was pleasure seeing you in clinic today. We discussed the following:    I am glad that you are tolerating methotrexate well.  She will continue her current dosage.  He can start to taper down her prednisone to 7.5 milligrams then 5 mg after 2 weeks.  I refilled the medication for you today.  Either repeat PET to evaluate for sarcoidosis activity in the heart while on the methotrexate.  Please schedule a follow-up with Dr. De La Vega.  We need blood draw every 3 months while on the methotrexate.      We will see you back in clinic in 3-4 months/weeks      For scheduling purposes:    Call [phone] to schedule a breathing or a walking test     Call [phone] to schedule  EKG's, Echocardiograms and Cardiopulmonary Stress Tests.    Call [phone] to schedule Radiology tests such as Nuclear Medicine Stress Tests, CT Scans, and MRI's.    Should you have any questions Please Call our pulmonary nurse Jennifer Toribio at [phone]  or my assistant Cher Gutierrez at [phone]

## 2024-07-05 NOTE — PROGRESS NOTES
Phone call visit    History of Present Illness  Mr. Song is a 57 y/o M with a PMHx sig for stage C systolic HF/presumed NICM (sarcoid)/HFrEF (LVEF 35-40%) and high grade AVB (Mobitz II) s/p dc-ppm from suspected sarcoidosis who is being evaluated for sarcoidosis trt     he has history of cardiac sarcoidosis first diagnosed in 2021 based off of an MRI he was initially. He was initially on 60 mg of prednisone for 1 year then tapered down all the way to 10 mg. He was also diagnosed with COVID in November 20 22nd but had minimal symptoms.     He currently carries a load in the strenuous exercise. He does not complain of any dyspnea on exertion or at rest. He also denies orthopnea platypnea or paroxysmal nocturnal dyspnea or lower extremity edema. He also denies chronic cough wheezing or sputum. His weight has been stable. He does not have recurrent lung infections. He has never had a lung biopsy in the past. He does not participating in pulmonary rehab. He was previously told that he has asthma. Has been well controlled and has not needed any inhalers. He does not wear any supplemental oxygen at home CPAP or BiPAP machine.     7/5/2024: Since the last visit, patient's breathing is mostly unchanged. No hospital admissions or ED visits. No new chest pain, cough, sputum, fever, chills or night sweats. Started methotrexate in April/May and is up to 8 pills. No noticeable side effects except on occasional fatigue.      Social history :  He is a never smoker  Denies alcohol or illicit drug use  Works as a   No exposure to asbestos silica or beryllium    Assessment and plan:    # Cardiac sarcoidosis:  -patient declined bronch with biopsy for diagnosis  -on methotrexate and prednisone as below  -Last PET in 03/2023. Will repeat PET scan in 3 months to allow enough time on Methotrexate  -encouraged to follow up with Dr. Bhatti    # Methotrexate therapy:  -started methotrexate in April/May and is up to 8  pills   -not on folic acid  -Stable cbc and hepatic function. Needs every 3 months labs    # On prednisone therapy:   -taper down to 5 mg         -refilled today    RTC in 3-4 months

## 2024-07-08 ENCOUNTER — APPOINTMENT (OUTPATIENT)
Dept: CARDIOLOGY | Facility: CLINIC | Age: 59
End: 2024-07-08
Payer: COMMERCIAL

## 2024-07-08 VITALS
BODY MASS INDEX: 29.47 KG/M2 | WEIGHT: 199 LBS | SYSTOLIC BLOOD PRESSURE: 96 MMHG | DIASTOLIC BLOOD PRESSURE: 68 MMHG | HEART RATE: 85 BPM | OXYGEN SATURATION: 98 % | HEIGHT: 69 IN

## 2024-07-08 DIAGNOSIS — I50.20 ACC/AHA STAGE C SYSTOLIC HEART FAILURE (MULTI): Primary | ICD-10-CM

## 2024-07-08 DIAGNOSIS — I48.0 PAROXYSMAL ATRIAL FIBRILLATION (MULTI): ICD-10-CM

## 2024-07-08 DIAGNOSIS — D86.85 CARDIAC SARCOIDOSIS (HHS-HCC): ICD-10-CM

## 2024-07-08 DIAGNOSIS — Z79.631 ON METHOTREXATE THERAPY: Primary | ICD-10-CM

## 2024-07-08 PROCEDURE — 1036F TOBACCO NON-USER: CPT | Performed by: INTERNAL MEDICINE

## 2024-07-08 PROCEDURE — 99214 OFFICE O/P EST MOD 30 MIN: CPT | Performed by: INTERNAL MEDICINE

## 2024-07-08 RX ORDER — FOLIC ACID 1 MG/1
1 TABLET ORAL DAILY
Qty: 30 TABLET | Refills: 11 | Status: SHIPPED | OUTPATIENT
Start: 2024-07-08 | End: 2025-07-08

## 2024-07-08 RX ORDER — EMPAGLIFLOZIN 10 MG/1
10 TABLET, FILM COATED ORAL DAILY
Qty: 30 TABLET | Refills: 0 | Status: SHIPPED | OUTPATIENT
Start: 2024-07-08

## 2024-07-08 RX ORDER — SACUBITRIL AND VALSARTAN 24; 26 MG/1; MG/1
1 TABLET, FILM COATED ORAL 2 TIMES DAILY
Qty: 180 TABLET | Refills: 3 | Status: SHIPPED | OUTPATIENT
Start: 2024-07-08

## 2024-07-08 RX ORDER — EMPAGLIFLOZIN 10 MG/1
10 TABLET, FILM COATED ORAL DAILY
Qty: 90 TABLET | Refills: 3 | Status: SHIPPED | OUTPATIENT
Start: 2024-07-08 | End: 2024-07-08 | Stop reason: SDUPTHER

## 2024-07-08 RX ORDER — SPIRONOLACTONE 25 MG/1
12.5 TABLET ORAL DAILY
Qty: 45 TABLET | Refills: 3 | Status: SHIPPED | OUTPATIENT
Start: 2024-07-08

## 2024-07-08 NOTE — PATIENT INSTRUCTIONS
It was a pleasure seeing you today. Please contact myself or my team with any questions.     To reach Dr. De La Vega' office please call [phone] (Monalisa).   Fax: [phone]   To schedule an appointment call [phone]     If you have any questions or need cardiac medication refills, please call the Heart Failure office at [phone], option 6. You may also contact the  Heart Failure Nursing team via email at [email or web address] (Please include your name and date of birth).         1) Continue your current medications  2) Think about an appointment with your PCP to discuss about treatment for depression  3) Follow up in 6 months at /[facility]

## 2024-07-08 NOTE — PROGRESS NOTES
[facility] Advanced Heart Failure Clinic  Primary Care Physician: Adrian Li MD  Referring Provider/Cardiologist: Michael     Date of Visit: 2024  3:40 PM EDT  Location of visit: [address]     HPI:   Mr. Song is a 59M with a PMHx sig for stage C systolic HF/NICM (sarcoid)/HFrEF and high grade AVB (Mobitz II) from suspected sarcoidosis s/p CRT-D who returns to the Advanced Heart Failure clinic for ongoing evaluation and management.    Interval hx:   Remains on methotrexate and prednisone. Currently denies chest pain, palpitations, orthopnea, PND. No edema noted in BLE. He does have some exertional dyspnea, but this remains stable. He also denies headaches, dizziness or recent falls.       Hospitalizations: Denies  Medication adherence: Patient states yes       SocHx:   , lives with long time girlfriend in [place]. Works full time as bread /salesman.  Tobacco: None past or current  EtOH: Socially at special events, no routine use  Recreational: None past or current    FamHx:  Mother: HTN, Afib, [place], HFpEF, living age 81/82  Father: CAD s/p CABG x 4 in 70's, living age 75  Oldest Brother: CAD, recent cath, needs PCI, living age 60  Paternal grandmother:  of heart problems  Paternal Uncles x 3:  of MI's  Maternal grandfather: Pacemaker  Paternal grandmother: CVA,  from MI later       Current Outpatient Medications   Medication Sig Dispense Refill    apixaban (Eliquis) 5 mg tablet Take 1 tablet (5 mg) by mouth 2 times a day. 180 tablet 3    Entresto 24-26 mg tablet Take 1 tablet by mouth 2 times a day. 60 tablet 11    Jardiance 10 mg Take 1 tablet (10 mg) by mouth once daily.      methotrexate (Trexall) 2.5 mg tablet Take 1 tablet (2.5 mg total) by mouth 1 (one) time per week for 7 days, THEN 2 tablets (5 mg total) 1 (one) time per week for 7 days, THEN 3 tablets (7.5 mg total) 1 (one) time per week for 7 days, THEN 5 tablets (12.5 mg total)  "1 (one) time per week for 7 days, THEN 6 tablets (15 mg total) 1 (one) time per week. Follow directions carefully, and ask to explain any part you do not understand. Take exactly as directed.. 12 tablet 0    metoprolol succinate XL (Toprol-XL) 25 mg 24 hr tablet Take 1 tablet (25 mg) by mouth once daily. Do not crush or chew. 30 tablet 11    predniSONE (Deltasone) 10 mg tablet Take 1 tablet (10 mg) by mouth once daily. 30 tablet 1    rosuvastatin (Crestor) 20 mg tablet Take 1 tablet (20 mg) by mouth once daily. 90 tablet 2    spironolactone (Aldactone) 25 mg tablet Take 1 half tablet by mouth once daily.      valACYclovir (Valtrex) 500 mg tablet Take 1 tablet (500 mg) by mouth once daily as needed (As needed for flare ups). 30 tablet 0    cholecalciferol (Vitamin D-3) 5,000 Units tablet Take 1 tablet (5,000 Units) by mouth once daily.       No current facility-administered medications for this visit.       Allergies   Allergen Reactions    House Dust Runny nose     asthma         Visit Vitals  BP 96/68 (BP Location: Left arm, Patient Position: Sitting)   Pulse 85   Ht 1.753 m (5' 9\")   Wt 90.3 kg (199 lb)   SpO2 98%   BMI 29.39 kg/m²   Smoking Status Never   BSA 2.1 m²        Physical Exam:  On exam Mr. Song appears his stated age, is alert and oriented x3, and in no acute distress. His sclera are anicteric and his oropharynx has moist mucous membranes. His neck is supple and without thyromegaly. The JVP is ~5 cm of water above the right atrium. His cardiac exam has regular rhythm, normal S1, S2. No S3/4. There are no murmurs. His lungs are clear to auscultation bilaterally and there is no dullness to percussion. His abdomen is soft, nontender with normoactive bowel sounds. There is no HJR. The extremities are warm and without edema. The skin is dry. There is no rash present. The distal pulses are 2+ in all four extremities. His mood and affect are appropriate for todays encounter.      Cardiac " Labs/Diagnostics:    Lab Results   Component Value Date    CREATININE 1.07 10/23/2023    BUN 15 10/23/2023     10/23/2023    K 4.4 10/23/2023     10/23/2023    CO2 28 10/23/2023        Recent Labs     10/23/23  1530 12/21/22  0920 09/15/21  1104 03/07/21  0647   CHOL 277* 188 140 154   LDLF  --  119* 77 98   LDLCALC 172*  --   --   --    HDL 86.3 56.1 53.0 38.0*   TRIG 96 64 51 92       Recent Labs     07/19/23  0850 02/25/22  1126   * 151*     Echo (1/15/24):  1. Left ventricular systolic function is severely decreased with a 25% estimated ejection fraction.  2. Multiple segmental abnormalities exist. See findings.  3. Abnormal septal motion consistent with RV pacemaker.  4. Spectral Doppler shows an impaired relaxation pattern of left ventricular diastolic filling.  5. The left atrium is moderately dilated.  6. RVSP within normal limits.  7. Compared to 7/2023 no change is seen.  8. There are multiple wall motion abnormalities.    Echo (7/10/23):  1. Left ventricular systolic function is severely decreased with a 20-25% estimated ejection fraction. 3D with contrat ej fx 21%, posterior akinenitic scar, severe dilation, no thrombus  2. Left ventricular cavity size is severely dilated.  3. There are multiple wall motion abnormalities.  4. Multiple segmental abnormalities exist. See findings.  5. There is low normal right ventricular systolic function.  6. Aortic valve sclerosis.  7. Moderate mitral valve regurgitation.    ECG (3/28/23):  A-V paced (HR 74)    PET scan (3/2/23):  1. Assuming appropriate fasting, there is increased metabolic activity along the basal 2/3 of the anterior, lateral, inferior, and septal left ventricular wall consistent with an inflammatory process such as myocarditis or sarcoidosis.  2. Perfusion defect in the inferior 1/3 of the inferior-septal wall, and a small territory of perfusion defect in the inferior apex, suggestive of myocardial scarring.  3. Cardiomegaly with  decreased ejection fraction at 34% (normal above 45%).  4. Hypermetabolic left hilar, left paratracheal, and right paratracheal lymph nodes are consistent with sarcoidosis involvement.    cMRI (4/5/22):  1. Biventricular Cardiomyopathy.  Differential includes but is not limited to cardiac sarcoidosis, arrhythmogenic rightventricular cardiomyopathy, hypertrophic cardiomyopathy. The findings of this study are consistent with a single major CMR criteria for the diagnosis of arrhythmogenic right ventricular cardiomyopathy per the 2010 Task Force criteria(focal RV dyskinesis and RV EF </= 40%).  2. Extensive confluent hyperenhancement the basal/mid inferior and inferior septal walls with partial sparing the left ventricular basal/mid subendocardial myocardium. Subendocardial hyperenhancement of the basal/mid inferior and lateral right ventricular myocardium.  3. T2 STIR: Increased signal intensity of the mid basal inferior septum and lateral RV myocardium. Finding consistent with myocardial inflammation/edema.  4. Asymmetric septal hypertrophy. Maximal septal thickness mid inferior septum 1.3 cm.  5. Dilated left ventricular cavity size with moderately depressed systolic function. Ejection fraction 37%.  6. Dilated right ventricular cavity size with mild-moderate depressed RV systolic function. RV EF 40%. Focal dyskinesis of the basal inferior wall.  7. Compared to previous cardiac MRI 6/29/21 there is an increase in left ventricular and right ventricular cavity volumes and decrease in left ventricular and right ventricular systolic function.    cMRI (6/29/21):  1. The findings of this study are suggestive of cardiac sarcoidosis. Differential would also include but is not limited to hypertrophic cardiomyopathy, arrhythmogenic right ventricular cardiomyopathy.  2. Extensive confluent hyperenhancement the basal/mid inferior and inferior septal walls with partial sparing the left ventricular basal/mid subendocardial  myocardium. Subendocardial hyperenhancement of the basal/mid inferior right ventricular myocardium  3. T2 STIR: Increased signal intensity of the mid basal inferior septum. Finding consistent with myocardial inflammation/edema.  4. Asymmetric septal hypertrophy. Maximal septal thickness mid inferior septum 1.9 cm.  5. Normal left ventricular cavity size with moderately depressed systolic function. Ejection fraction 42%.  6. Normal right ventricular cavity size with mild-moderate depressed RV systolic function. RV EF 45%. Focal dyskinesis of the basal inferior wall.    Cardiac cath (3/15/21):  Mild CAD.     Echo (3/6/21):  1. The left ventricular systolic function is mildly decreased.  2. Multiple segmental abnormalities exist. See findings.  3. Aortic valve stenosis is not present.  4. There is global hypokinesis of the left ventricle with minor regional variations.      Impression/Plan:  Mr. Song is a 59M with a PMHx sig for stage C systolic HF/NICM (sarcoid)/HFrEF and high grade AVB (Mobitz II) from suspected sarcoidosis s/p CRT-D who returns to the Advanced Heart Failure clinic for ongoing evaluation and management. At the current time he has functional class II symptoms and appears euvolemic on exam.     1) High grade AVB (Mobitz II) s/p dc-ppm secondary to sarcoidosis now s/p CRT-D  cMRI with LV dysfunction and findings consistent with cardiac sarcoidosis, including both scar and inflammation. Prednisone tapered and bactrim stopped. PET finally approved with evidence concerning for extra-cardiac sarcoidosis. Currently on methotrexate with a planned repeat PET scan.   -c/w prednisone and methotrexate; will f/u with Dr. Goodrich    2) Stage C chronic systolic HF/NICM (sarcoid)/HFrEF (LVEF 35-40-->25%; 1/2024) s/p CRT-D  Repeat echo with persistent LV dysfunction.   -c/w metoprolol succinate 25 mg daily, entresto 24/26 mg bid, spironolactone 12.5 mg daily, jardiance 10 mg daily    3) pAF on DOAC therapy  -c/w  metoprolol, eliquis 5 mg bid  -f/u with EP    4) Dyslipidemia   (10/2023)  -c/w rosuvastatin 20 mg daily      F/U: 6 months at /[facility]    Eben,  Thank you for referring Mr. Song to the  Advanced Heart Failure Clinic. Please let me know if you have any questions.      ____________________________________________________________  Jh De La Vega DO  Section of Advanced Heart Failure and Cardiac Transplantation  Division of Cardiovascular Medicine  [facility] [place]  [facility]

## 2024-07-26 DIAGNOSIS — D86.0 PULMONARY SARCOIDOSIS (MULTI): ICD-10-CM

## 2024-07-29 RX ORDER — METHOTREXATE 2.5 MG/1
15 TABLET ORAL
Qty: 12 TABLET | Refills: 5 | Status: SHIPPED | OUTPATIENT
Start: 2024-08-04

## 2024-08-26 ENCOUNTER — HOSPITAL ENCOUNTER (OUTPATIENT)
Dept: CARDIOLOGY | Facility: HOSPITAL | Age: 59
Discharge: HOME | End: 2024-08-26
Payer: COMMERCIAL

## 2024-08-26 DIAGNOSIS — I42.9 CARDIOMYOPATHY, UNSPECIFIED TYPE (MULTI): ICD-10-CM

## 2024-08-26 DIAGNOSIS — Z95.810 PRESENCE OF AUTOMATIC CARDIOVERTER/DEFIBRILLATOR (AICD): ICD-10-CM

## 2024-08-26 PROCEDURE — 93296 REM INTERROG EVL PM/IDS: CPT

## 2024-08-26 PROCEDURE — 93295 DEV INTERROG REMOTE 1/2/MLT: CPT | Performed by: INTERNAL MEDICINE

## 2024-09-05 ENCOUNTER — LAB (OUTPATIENT)
Dept: LAB | Facility: LAB | Age: 59
End: 2024-09-05
Payer: COMMERCIAL

## 2024-09-05 DIAGNOSIS — D86.0 PULMONARY SARCOIDOSIS (MULTI): ICD-10-CM

## 2024-09-05 LAB
ALBUMIN SERPL BCP-MCNC: 4 G/DL (ref 3.4–5)
ALP SERPL-CCNC: 44 U/L (ref 33–120)
ALT SERPL W P-5'-P-CCNC: 16 U/L (ref 10–52)
AST SERPL W P-5'-P-CCNC: 13 U/L (ref 9–39)
BASOPHILS # BLD AUTO: 0.05 X10*3/UL (ref 0–0.1)
BASOPHILS NFR BLD AUTO: 0.7 %
BILIRUB DIRECT SERPL-MCNC: 0.1 MG/DL (ref 0–0.3)
BILIRUB SERPL-MCNC: 0.6 MG/DL (ref 0–1.2)
EOSINOPHIL # BLD AUTO: 0.3 X10*3/UL (ref 0–0.7)
EOSINOPHIL NFR BLD AUTO: 4.2 %
ERYTHROCYTE [DISTWIDTH] IN BLOOD BY AUTOMATED COUNT: 14.6 % (ref 11.5–14.5)
HCT VFR BLD AUTO: 43.7 % (ref 41–52)
HGB BLD-MCNC: 14.4 G/DL (ref 13.5–17.5)
IMM GRANULOCYTES # BLD AUTO: 0.03 X10*3/UL (ref 0–0.7)
IMM GRANULOCYTES NFR BLD AUTO: 0.4 % (ref 0–0.9)
LYMPHOCYTES # BLD AUTO: 3 X10*3/UL (ref 1.2–4.8)
LYMPHOCYTES NFR BLD AUTO: 42.4 %
MCH RBC QN AUTO: 31.7 PG (ref 26–34)
MCHC RBC AUTO-ENTMCNC: 33 G/DL (ref 32–36)
MCV RBC AUTO: 96 FL (ref 80–100)
MONOCYTES # BLD AUTO: 0.66 X10*3/UL (ref 0.1–1)
MONOCYTES NFR BLD AUTO: 9.3 %
NEUTROPHILS # BLD AUTO: 3.03 X10*3/UL (ref 1.2–7.7)
NEUTROPHILS NFR BLD AUTO: 43 %
NRBC BLD-RTO: 0 /100 WBCS (ref 0–0)
PLATELET # BLD AUTO: 232 X10*3/UL (ref 150–450)
PROT SERPL-MCNC: 6.2 G/DL (ref 6.4–8.2)
RBC # BLD AUTO: 4.54 X10*6/UL (ref 4.5–5.9)
WBC # BLD AUTO: 7.1 X10*3/UL (ref 4.4–11.3)

## 2024-09-05 PROCEDURE — 36415 COLL VENOUS BLD VENIPUNCTURE: CPT

## 2024-09-05 PROCEDURE — 80076 HEPATIC FUNCTION PANEL: CPT

## 2024-09-05 PROCEDURE — 85025 COMPLETE CBC W/AUTO DIFF WBC: CPT

## 2024-09-26 DIAGNOSIS — I50.20 SYSTOLIC HEART FAILURE, ACC/AHA STAGE C: ICD-10-CM

## 2024-09-26 RX ORDER — METOPROLOL SUCCINATE 25 MG/1
25 TABLET, EXTENDED RELEASE ORAL DAILY
Qty: 30 TABLET | Refills: 11 | Status: SHIPPED | OUTPATIENT
Start: 2024-09-26 | End: 2025-09-26

## 2024-10-01 ENCOUNTER — APPOINTMENT (OUTPATIENT)
Dept: CARDIOLOGY | Facility: CLINIC | Age: 59
End: 2024-10-01
Payer: COMMERCIAL

## 2024-10-07 ENCOUNTER — APPOINTMENT (OUTPATIENT)
Dept: CARDIOLOGY | Facility: CLINIC | Age: 59
End: 2024-10-07
Payer: COMMERCIAL

## 2024-10-07 VITALS
HEIGHT: 69 IN | OXYGEN SATURATION: 98 % | DIASTOLIC BLOOD PRESSURE: 63 MMHG | HEART RATE: 78 BPM | BODY MASS INDEX: 29.92 KG/M2 | WEIGHT: 202 LBS | SYSTOLIC BLOOD PRESSURE: 106 MMHG

## 2024-10-07 DIAGNOSIS — I42.8 NONISCHEMIC CARDIOMYOPATHY (MULTI): ICD-10-CM

## 2024-10-07 DIAGNOSIS — I50.20 SYSTOLIC HEART FAILURE, ACC/AHA STAGE C: Primary | ICD-10-CM

## 2024-10-07 DIAGNOSIS — D86.85 CARDIAC SARCOIDOSIS: ICD-10-CM

## 2024-10-07 DIAGNOSIS — Z95.810 CARDIAC RESYNCHRONIZATION THERAPY DEFIBRILLATOR (CRT-D) IN PLACE: ICD-10-CM

## 2024-10-07 PROCEDURE — 93000 ELECTROCARDIOGRAM COMPLETE: CPT | Performed by: INTERNAL MEDICINE

## 2024-10-07 PROCEDURE — 1036F TOBACCO NON-USER: CPT | Performed by: INTERNAL MEDICINE

## 2024-10-07 PROCEDURE — 3008F BODY MASS INDEX DOCD: CPT | Performed by: INTERNAL MEDICINE

## 2024-10-07 PROCEDURE — 99214 OFFICE O/P EST MOD 30 MIN: CPT | Performed by: INTERNAL MEDICINE

## 2024-10-07 NOTE — PATIENT INSTRUCTIONS
It was a pleasure seeing you today. Please contact myself or my team with any questions.     To reach Dr. De La Vega' office please call [phone] (Monalisa).   Fax: [phone]   To schedule an appointment call [phone]     If you have any questions or need cardiac medication refills, please call the Heart Failure office at [phone], option 6. You may also contact the  Heart Failure Nursing team via email at [email or web address] (Please include your name and date of birth).         1) Continue your current medications  2) Follow up in 6 months at /[facility]

## 2024-10-07 NOTE — PROGRESS NOTES
[facility] Advanced Heart Failure Clinic  Primary Care Physician: Adrian Li MD  Referring Provider/Cardiologist: Michael     Date of Visit: 10/07/2024  4:20 PM EDT  Location of visit: [address]     HPI:   Mr. Song is a 59M with a PMHx sig for stage C systolic HF/NICM (sarcoid)/HFrEF with severe LV dysfunction and high grade AVB (Mobitz II) from suspected sarcoidosis s/p CRT-D who returns to the Advanced Heart Failure clinic for ongoing evaluation and management.    Interval hx:   He remains on methotrexate and prednisone. He is scheduled for a repeat PET next Monday.     At the current time he report intermittent chest pain and exertional dyspnea. He denies palpitations, ICD shocks, or LE edema.     Hospitalizations: Denies        SocHx:   , lives with long time girlfriend in [place]. Works full time as bread /salesman.  Tobacco: None past or current  EtOH: Socially at special events, no routine use  Recreational: None past or current    FamHx:  Mother: HTN, Afib, [place], HFpEF, living age 81/82  Father: CAD s/p CABG x 4 in 70's, living age 75  Oldest Brother: CAD, recent cath, needs PCI, living age 60  Paternal grandmother:  of heart problems  Paternal Uncles x 3:  of MI's  Maternal grandfather: Pacemaker  Paternal grandmother: CVA,  from MI later       Current Outpatient Medications   Medication Sig Dispense Refill    apixaban (Eliquis) 5 mg tablet Take 1 tablet (5 mg) by mouth 2 times a day. 180 tablet 3    cholecalciferol (Vitamin D-3) 5,000 Units tablet Take 1 tablet (5,000 Units) by mouth once daily.      Entresto 24-26 mg tablet Take 1 tablet by mouth 2 times a day. 180 tablet 3    folic acid (Folvite) 1 mg tablet Take 1 tablet (1 mg) by mouth once daily. 30 tablet 11    Jardiance 10 mg Take 1 tablet (10 mg) by mouth once daily. 30 tablet 0    methotrexate (Trexall) 2.5 mg tablet Take 1 tablet (2.5 mg total) by mouth 1 (one) time per  "week for 7 days, THEN 2 tablets (5 mg total) 1 (one) time per week for 7 days, THEN 3 tablets (7.5 mg total) 1 (one) time per week for 7 days, THEN 5 tablets (12.5 mg total) 1 (one) time per week for 7 days, THEN 6 tablets (15 mg total) 1 (one) time per week. Follow directions carefully, and ask to explain any part you do not understand. Take exactly as directed.. 12 tablet 0    methotrexate (Trexall) 2.5 mg tablet Take 6 tablets (15 mg total) by mouth 1 (one) time per week.  Follow directions carefully, and ask to explain any part you do not understand. Take exactly as directed. 12 tablet 5    metoprolol succinate XL (Toprol-XL) 25 mg 24 hr tablet Take 1 tablet (25 mg) by mouth once daily. Do not crush or chew. 30 tablet 11    predniSONE (Deltasone) 10 mg tablet Take 1 tablet (10 mg) by mouth once daily. 30 tablet 1    rosuvastatin (Crestor) 20 mg tablet Take 1 tablet (20 mg) by mouth once daily. 90 tablet 2    spironolactone (Aldactone) 25 mg tablet Take 0.5 tablets (12.5 mg) by mouth once daily. 45 tablet 3    valACYclovir (Valtrex) 500 mg tablet Take 1 tablet (500 mg) by mouth once daily as needed (As needed for flare ups). 30 tablet 0     No current facility-administered medications for this visit.       Allergies   Allergen Reactions    House Dust Runny nose     asthma         Visit Vitals  /63 (BP Location: Left arm, Patient Position: Sitting)   Pulse 78   Ht 1.753 m (5' 9\")   Wt 91.6 kg (202 lb)   SpO2 98%   BMI 29.83 kg/m²   Smoking Status Never   BSA 2.11 m²        Physical Exam:  On exam Mr. Song appears his stated age, is alert and oriented x3, and in no acute distress. His sclera are anicteric and his oropharynx has moist mucous membranes. His neck is supple and without thyromegaly. The JVP is ~6 cm of water above the right atrium. His cardiac exam has regular rhythm, normal S1, S2. No S3/4. There are no murmurs. His lungs are clear to auscultation bilaterally and there is no dullness to " percussion. His abdomen is soft, nontender with normoactive bowel sounds. There is no HJR. The extremities are warm and without edema. The skin is dry. There is no rash present. The distal pulses are 2+ in all four extremities. His mood and affect are appropriate for todays encounter.      Cardiac Labs/Diagnostics:    Lab Results   Component Value Date    CREATININE 1.07 10/23/2023    BUN 15 10/23/2023     10/23/2023    K 4.4 10/23/2023     10/23/2023    CO2 28 10/23/2023        Recent Labs     10/23/23  1530 12/21/22  0920 09/15/21  1104 03/07/21  0647   CHOL 277* 188 140 154   LDLF  --  119* 77 98   LDLCALC 172*  --   --   --    HDL 86.3 56.1 53.0 38.0*   TRIG 96 64 51 92       Recent Labs     07/19/23  0850 02/25/22  1126   * 151*       ECG (10/7/24):  A-V paced (HR 74)    Echo (1/15/24):  1. Left ventricular systolic function is severely decreased with a 25% estimated ejection fraction.  2. Multiple segmental abnormalities exist. See findings.  3. Abnormal septal motion consistent with RV pacemaker.  4. Spectral Doppler shows an impaired relaxation pattern of left ventricular diastolic filling.  5. The left atrium is moderately dilated.  6. RVSP within normal limits.  7. Compared to 7/2023 no change is seen.  8. There are multiple wall motion abnormalities.    Echo (7/10/23):  1. Left ventricular systolic function is severely decreased with a 20-25% estimated ejection fraction. 3D with contrat ej fx 21%, posterior akinenitic scar, severe dilation, no thrombus  2. Left ventricular cavity size is severely dilated.  3. There are multiple wall motion abnormalities.  4. Multiple segmental abnormalities exist. See findings.  5. There is low normal right ventricular systolic function.  6. Aortic valve sclerosis.  7. Moderate mitral valve regurgitation.    ECG (3/28/23):  A-V paced (HR 74)    PET scan (3/2/23):  1. Assuming appropriate fasting, there is increased metabolic activity along the basal 2/3  of the anterior, lateral, inferior, and septal left ventricular wall consistent with an inflammatory process such as myocarditis or sarcoidosis.  2. Perfusion defect in the inferior 1/3 of the inferior-septal wall, and a small territory of perfusion defect in the inferior apex, suggestive of myocardial scarring.  3. Cardiomegaly with decreased ejection fraction at 34% (normal above 45%).  4. Hypermetabolic left hilar, left paratracheal, and right paratracheal lymph nodes are consistent with sarcoidosis involvement.    cMRI (4/5/22):  1. Biventricular Cardiomyopathy.  Differential includes but is not limited to cardiac sarcoidosis, arrhythmogenic rightventricular cardiomyopathy, hypertrophic cardiomyopathy. The findings of this study are consistent with a single major CMR criteria for the diagnosis of arrhythmogenic right ventricular cardiomyopathy per the 2010 Task Force criteria(focal RV dyskinesis and RV EF </= 40%).  2. Extensive confluent hyperenhancement the basal/mid inferior and inferior septal walls with partial sparing the left ventricular basal/mid subendocardial myocardium. Subendocardial hyperenhancement of the basal/mid inferior and lateral right ventricular myocardium.  3. T2 STIR: Increased signal intensity of the mid basal inferior septum and lateral RV myocardium. Finding consistent with myocardial inflammation/edema.  4. Asymmetric septal hypertrophy. Maximal septal thickness mid inferior septum 1.3 cm.  5. Dilated left ventricular cavity size with moderately depressed systolic function. Ejection fraction 37%.  6. Dilated right ventricular cavity size with mild-moderate depressed RV systolic function. RV EF 40%. Focal dyskinesis of the basal inferior wall.  7. Compared to previous cardiac MRI 6/29/21 there is an increase in left ventricular and right ventricular cavity volumes and decrease in left ventricular and right ventricular systolic function.    cMRI (6/29/21):  1. The findings of this study  are suggestive of cardiac sarcoidosis. Differential would also include but is not limited to hypertrophic cardiomyopathy, arrhythmogenic right ventricular cardiomyopathy.  2. Extensive confluent hyperenhancement the basal/mid inferior and inferior septal walls with partial sparing the left ventricular basal/mid subendocardial myocardium. Subendocardial hyperenhancement of the basal/mid inferior right ventricular myocardium  3. T2 STIR: Increased signal intensity of the mid basal inferior septum. Finding consistent with myocardial inflammation/edema.  4. Asymmetric septal hypertrophy. Maximal septal thickness mid inferior septum 1.9 cm.  5. Normal left ventricular cavity size with moderately depressed systolic function. Ejection fraction 42%.  6. Normal right ventricular cavity size with mild-moderate depressed RV systolic function. RV EF 45%. Focal dyskinesis of the basal inferior wall.    Cardiac cath (3/15/21):  Mild CAD.     Echo (3/6/21):  1. The left ventricular systolic function is mildly decreased.  2. Multiple segmental abnormalities exist. See findings.  3. Aortic valve stenosis is not present.  4. There is global hypokinesis of the left ventricle with minor regional variations.      Impression/Plan:  Mr. Song is a 59M with a PMHx sig for stage C systolic HF/NICM (sarcoid)/HFrEF with severe LV dysfunction and high grade AVB (Mobitz II) from suspected sarcoidosis s/p CRT-D who returns to the Advanced Heart Failure clinic for ongoing evaluation and management.At the current time he has functional class II symptoms and appears euvolemic on exam.     1) High grade AVB (Mobitz II) s/p dc-ppm secondary to sarcoidosis now s/p CRT-D  cMRI with LV dysfunction and findings consistent with cardiac sarcoidosis, including both scar and inflammation. Remains on methotrexate and prednisone with a planned repeat PET scan next Monday.   -c/w prednisone and methotrexate; will f/u with Dr. Goodrich    2) Stage C chronic  systolic HF/NICM (sarcoid)/HFrEF with severe LV dysfunction (LVEF 35-40-->25%; 1/2024) s/p CRT-D  Repeat echo with persistent LV dysfunction.   -c/w metoprolol succinate 25 mg daily, entresto 24/26 mg bid, spironolactone 12.5 mg daily, jardiance 10 mg daily    3) pAF on DOAC therapy  -c/w metoprolol, eliquis 5 mg bid  -f/u with EP    4) Dyslipidemia   (10/2023)  -c/w rosuvastatin 20 mg daily      F/U: 6 months at /[facility]    Eben,  Thank you for referring Mr. Song to the  Advanced Heart Failure Clinic. Please let me know if you have any questions.    ____________________________________________________________  hJ De La Vega DO  Section of Advanced Heart Failure and Cardiac Transplantation  Division of Cardiovascular Medicine  [facility] [place]  [facility]

## 2024-10-14 ENCOUNTER — HOSPITAL ENCOUNTER (OUTPATIENT)
Dept: RADIOLOGY | Facility: HOSPITAL | Age: 59
Discharge: HOME | End: 2024-10-14
Payer: COMMERCIAL

## 2024-10-14 DIAGNOSIS — D86.85 SARCOID MYOCARDITIS (HHS-HCC): ICD-10-CM

## 2024-10-14 DIAGNOSIS — Z79.60 LONG-TERM USE OF IMMUNOSUPPRESSANT MEDICATION: ICD-10-CM

## 2024-10-14 LAB — GLUCOSE BLD MANUAL STRIP-MCNC: 58 MG/DL (ref 74–99)

## 2024-10-14 PROCEDURE — 82947 ASSAY GLUCOSE BLOOD QUANT: CPT

## 2024-10-14 PROCEDURE — 78432 MYOCRD IMG PET 2RTRACER: CPT

## 2024-10-14 PROCEDURE — A9552 F18 FDG: HCPCS | Performed by: INTERNAL MEDICINE

## 2024-10-14 PROCEDURE — A9526 NITROGEN N-13 AMMONIA: HCPCS | Performed by: INTERNAL MEDICINE

## 2024-10-14 PROCEDURE — 3430000001 HC RX 343 DIAGNOSTIC RADIOPHARMACEUTICALS: Performed by: INTERNAL MEDICINE

## 2024-10-14 PROCEDURE — 78433 MYOCRD IMG PET 2RTRACER CT: CPT | Performed by: STUDENT IN AN ORGANIZED HEALTH CARE EDUCATION/TRAINING PROGRAM

## 2024-10-14 RX ORDER — AMMONIA N-13 37.5 MCI/ML
13.3 INJECTION INTRAVENOUS
Status: COMPLETED | OUTPATIENT
Start: 2024-10-14 | End: 2024-10-14

## 2024-10-14 RX ORDER — FLUDEOXYGLUCOSE F 18 200 MCI/ML
13.94 INJECTION, SOLUTION INTRAVENOUS
Status: COMPLETED | OUTPATIENT
Start: 2024-10-14 | End: 2024-10-14

## 2024-10-17 DIAGNOSIS — D86.0 PULMONARY SARCOIDOSIS (MULTI): ICD-10-CM

## 2024-10-18 DIAGNOSIS — Z00.00 LABORATORY TESTS ORDERED AS PART OF A COMPLETE PHYSICAL EXAM (CPE): ICD-10-CM

## 2024-10-21 ENCOUNTER — PATIENT MESSAGE (OUTPATIENT)
Dept: PULMONOLOGY | Facility: HOSPITAL | Age: 59
End: 2024-10-21

## 2024-10-21 DIAGNOSIS — D86.85 SARCOID MYOCARDITIS (HHS-HCC): ICD-10-CM

## 2024-10-21 RX ORDER — METHOTREXATE 2.5 MG/1
15 TABLET ORAL
Qty: 12 TABLET | Refills: 5 | Status: SHIPPED | OUTPATIENT
Start: 2024-10-27

## 2024-10-24 ENCOUNTER — APPOINTMENT (OUTPATIENT)
Dept: PRIMARY CARE | Facility: CLINIC | Age: 59
End: 2024-10-24
Payer: COMMERCIAL

## 2024-10-28 ENCOUNTER — TELEMEDICINE (OUTPATIENT)
Dept: PULMONOLOGY | Facility: HOSPITAL | Age: 59
End: 2024-10-28
Payer: COMMERCIAL

## 2024-10-28 DIAGNOSIS — D86.0 PULMONARY SARCOIDOSIS (MULTI): ICD-10-CM

## 2024-10-28 DIAGNOSIS — Z79.60 LONG-TERM USE OF IMMUNOSUPPRESSANT MEDICATION: Primary | ICD-10-CM

## 2024-10-28 PROCEDURE — 99215 OFFICE O/P EST HI 40 MIN: CPT | Performed by: INTERNAL MEDICINE

## 2024-10-28 RX ORDER — METHOTREXATE 2.5 MG/1
15 TABLET ORAL
Qty: 36 TABLET | Refills: 11 | Status: SHIPPED | OUTPATIENT
Start: 2024-10-28 | End: 2027-04-16

## 2024-11-14 ENCOUNTER — APPOINTMENT (OUTPATIENT)
Dept: PRIMARY CARE | Facility: CLINIC | Age: 59
End: 2024-11-14
Payer: COMMERCIAL

## 2024-11-18 ENCOUNTER — LAB (OUTPATIENT)
Dept: LAB | Facility: LAB | Age: 59
End: 2024-11-18
Payer: COMMERCIAL

## 2024-11-18 DIAGNOSIS — Z00.00 LABORATORY TESTS ORDERED AS PART OF A COMPLETE PHYSICAL EXAM (CPE): ICD-10-CM

## 2024-11-18 LAB
25(OH)D3 SERPL-MCNC: 40 NG/ML (ref 30–100)
ALT SERPL W P-5'-P-CCNC: 25 U/L (ref 10–52)
ANION GAP SERPL CALC-SCNC: 9 MMOL/L (ref 10–20)
APPEARANCE UR: CLEAR
BASOPHILS # BLD AUTO: 0.05 X10*3/UL (ref 0–0.1)
BASOPHILS NFR BLD AUTO: 0.7 %
BILIRUB UR STRIP.AUTO-MCNC: NEGATIVE MG/DL
BUN SERPL-MCNC: 21 MG/DL (ref 6–23)
CALCIUM SERPL-MCNC: 8.8 MG/DL (ref 8.6–10.3)
CHLORIDE SERPL-SCNC: 108 MMOL/L (ref 98–107)
CHOLEST SERPL-MCNC: 162 MG/DL (ref 0–199)
CHOLESTEROL/HDL RATIO: 2.9
CO2 SERPL-SCNC: 28 MMOL/L (ref 21–32)
COLOR UR: ABNORMAL
CREAT SERPL-MCNC: 1.16 MG/DL (ref 0.5–1.3)
EGFRCR SERPLBLD CKD-EPI 2021: 73 ML/MIN/1.73M*2
EOSINOPHIL # BLD AUTO: 0.23 X10*3/UL (ref 0–0.7)
EOSINOPHIL NFR BLD AUTO: 3.4 %
ERYTHROCYTE [DISTWIDTH] IN BLOOD BY AUTOMATED COUNT: 14.1 % (ref 11.5–14.5)
EST. AVERAGE GLUCOSE BLD GHB EST-MCNC: 108 MG/DL
GLUCOSE SERPL-MCNC: 106 MG/DL (ref 74–99)
GLUCOSE UR STRIP.AUTO-MCNC: ABNORMAL MG/DL
HBA1C MFR BLD: 5.4 %
HCT VFR BLD AUTO: 45.6 % (ref 41–52)
HDLC SERPL-MCNC: 56.1 MG/DL
HGB BLD-MCNC: 14.9 G/DL (ref 13.5–17.5)
IMM GRANULOCYTES # BLD AUTO: 0.03 X10*3/UL (ref 0–0.7)
IMM GRANULOCYTES NFR BLD AUTO: 0.4 % (ref 0–0.9)
KETONES UR STRIP.AUTO-MCNC: NEGATIVE MG/DL
LDLC SERPL CALC-MCNC: 86 MG/DL
LEUKOCYTE ESTERASE UR QL STRIP.AUTO: NEGATIVE
LYMPHOCYTES # BLD AUTO: 2.03 X10*3/UL (ref 1.2–4.8)
LYMPHOCYTES NFR BLD AUTO: 30.4 %
MCH RBC QN AUTO: 31.6 PG (ref 26–34)
MCHC RBC AUTO-ENTMCNC: 32.7 G/DL (ref 32–36)
MCV RBC AUTO: 97 FL (ref 80–100)
MONOCYTES # BLD AUTO: 0.67 X10*3/UL (ref 0.1–1)
MONOCYTES NFR BLD AUTO: 10 %
MUCOUS THREADS #/AREA URNS AUTO: NORMAL /LPF
NEUTROPHILS # BLD AUTO: 3.66 X10*3/UL (ref 1.2–7.7)
NEUTROPHILS NFR BLD AUTO: 55.1 %
NITRITE UR QL STRIP.AUTO: NEGATIVE
NON HDL CHOLESTEROL: 106 MG/DL (ref 0–149)
NRBC BLD-RTO: 0 /100 WBCS (ref 0–0)
PH UR STRIP.AUTO: 5 [PH]
PLATELET # BLD AUTO: 195 X10*3/UL (ref 150–450)
POTASSIUM SERPL-SCNC: 3.9 MMOL/L (ref 3.5–5.3)
PROT UR STRIP.AUTO-MCNC: NEGATIVE MG/DL
PSA SERPL-MCNC: 0.63 NG/ML
RBC # BLD AUTO: 4.71 X10*6/UL (ref 4.5–5.9)
RBC # UR STRIP.AUTO: ABNORMAL /UL
RBC #/AREA URNS AUTO: NORMAL /HPF
SODIUM SERPL-SCNC: 141 MMOL/L (ref 136–145)
SP GR UR STRIP.AUTO: 1.03
TRIGL SERPL-MCNC: 99 MG/DL (ref 0–149)
TSH SERPL-ACNC: 0.47 MIU/L (ref 0.44–3.98)
UROBILINOGEN UR STRIP.AUTO-MCNC: NORMAL MG/DL
VIT B12 SERPL-MCNC: 389 PG/ML (ref 211–911)
VLDL: 20 MG/DL (ref 0–40)
WBC # BLD AUTO: 6.7 X10*3/UL (ref 4.4–11.3)
WBC #/AREA URNS AUTO: NORMAL /HPF

## 2024-11-18 PROCEDURE — 85025 COMPLETE CBC W/AUTO DIFF WBC: CPT

## 2024-11-18 PROCEDURE — 80061 LIPID PANEL: CPT

## 2024-11-18 PROCEDURE — 84460 ALANINE AMINO (ALT) (SGPT): CPT

## 2024-11-18 PROCEDURE — 83036 HEMOGLOBIN GLYCOSYLATED A1C: CPT

## 2024-11-18 PROCEDURE — 36415 COLL VENOUS BLD VENIPUNCTURE: CPT

## 2024-11-18 PROCEDURE — 82607 VITAMIN B-12: CPT

## 2024-11-18 PROCEDURE — 80048 BASIC METABOLIC PNL TOTAL CA: CPT

## 2024-11-18 PROCEDURE — 81001 URINALYSIS AUTO W/SCOPE: CPT

## 2024-11-18 PROCEDURE — 84443 ASSAY THYROID STIM HORMONE: CPT

## 2024-11-18 PROCEDURE — 82306 VITAMIN D 25 HYDROXY: CPT

## 2024-11-18 PROCEDURE — 84153 ASSAY OF PSA TOTAL: CPT

## 2024-11-21 ENCOUNTER — APPOINTMENT (OUTPATIENT)
Dept: PRIMARY CARE | Facility: CLINIC | Age: 59
End: 2024-11-21
Payer: COMMERCIAL

## 2024-11-21 VITALS
BODY MASS INDEX: 30.21 KG/M2 | RESPIRATION RATE: 16 BRPM | WEIGHT: 204 LBS | TEMPERATURE: 97.3 F | DIASTOLIC BLOOD PRESSURE: 70 MMHG | HEIGHT: 69 IN | SYSTOLIC BLOOD PRESSURE: 90 MMHG | OXYGEN SATURATION: 97 % | HEART RATE: 76 BPM

## 2024-11-21 DIAGNOSIS — I50.20 ACC/AHA STAGE C SYSTOLIC HEART FAILURE: ICD-10-CM

## 2024-11-21 DIAGNOSIS — R45.86 MOOD CHANGE: Primary | ICD-10-CM

## 2024-11-21 DIAGNOSIS — Z00.00 ROUTINE GENERAL MEDICAL EXAMINATION AT A HEALTH CARE FACILITY: ICD-10-CM

## 2024-11-21 RX ORDER — EMPAGLIFLOZIN 10 MG/1
10 TABLET, FILM COATED ORAL DAILY
Qty: 42 TABLET | Refills: 0 | COMMUNITY
Start: 2024-11-21

## 2024-11-21 RX ORDER — SERTRALINE HYDROCHLORIDE 50 MG/1
50 TABLET, FILM COATED ORAL DAILY
Qty: 30 TABLET | Refills: 1 | Status: SHIPPED | OUTPATIENT
Start: 2024-11-21 | End: 2025-01-20

## 2024-11-22 ENCOUNTER — TELEPHONE (OUTPATIENT)
Dept: PRIMARY CARE | Facility: CLINIC | Age: 59
End: 2024-11-22
Payer: COMMERCIAL

## 2024-11-22 DIAGNOSIS — R05.9 COUGH, UNSPECIFIED TYPE: Primary | ICD-10-CM

## 2024-11-22 RX ORDER — FLUTICASONE PROPIONATE 50 UG/1
1 POWDER, METERED RESPIRATORY (INHALATION)
Qty: 60 EACH | Refills: 3 | Status: SHIPPED | OUTPATIENT
Start: 2024-11-22

## 2024-11-22 RX ORDER — FLUTICASONE PROPIONATE 50 UG/1
1 POWDER, METERED RESPIRATORY (INHALATION)
COMMUNITY
End: 2024-11-22 | Stop reason: SDUPTHER

## 2024-11-22 NOTE — TELEPHONE ENCOUNTER
Pt req inhaler, prev Flovent worked for him.  Last couple months, activity induced SOB.    Pharm:  [facility]

## 2024-12-02 ENCOUNTER — HOSPITAL ENCOUNTER (OUTPATIENT)
Dept: CARDIOLOGY | Facility: HOSPITAL | Age: 59
Discharge: HOME | End: 2024-12-02
Payer: COMMERCIAL

## 2024-12-02 DIAGNOSIS — Z95.810 BIVENTRICULAR ICD (IMPLANTABLE CARDIOVERTER-DEFIBRILLATOR) IN PLACE: ICD-10-CM

## 2024-12-02 DIAGNOSIS — I42.8 NONISCHEMIC CARDIOMYOPATHY (MULTI): ICD-10-CM

## 2024-12-02 DIAGNOSIS — Z95.810 BIVENTRICULAR ICD (IMPLANTABLE CARDIOVERTER-DEFIBRILLATOR) IN PLACE: Primary | ICD-10-CM

## 2024-12-02 PROCEDURE — 93295 DEV INTERROG REMOTE 1/2/MLT: CPT | Performed by: INTERNAL MEDICINE

## 2024-12-02 PROCEDURE — 93296 REM INTERROG EVL PM/IDS: CPT

## 2024-12-03 RX ORDER — PREDNISONE 10 MG/1
10 TABLET ORAL DAILY
Qty: 30 TABLET | Refills: 1 | Status: SHIPPED | OUTPATIENT
Start: 2024-12-03

## 2024-12-27 ENCOUNTER — TELEPHONE (OUTPATIENT)
Dept: CARDIOLOGY | Facility: CLINIC | Age: 59
End: 2024-12-27
Payer: COMMERCIAL

## 2024-12-27 DIAGNOSIS — Z95.810 BIVENTRICULAR ICD (IMPLANTABLE CARDIOVERTER-DEFIBRILLATOR) IN PLACE: ICD-10-CM

## 2024-12-27 DIAGNOSIS — I47.29 NONSUSTAINED VENTRICULAR TACHYCARDIA (MULTI): ICD-10-CM

## 2025-01-06 ENCOUNTER — TELEPHONE (OUTPATIENT)
Dept: PRIMARY CARE | Facility: CLINIC | Age: 60
End: 2025-01-06
Payer: COMMERCIAL

## 2025-01-06 DIAGNOSIS — R05.1 ACUTE COUGH: Primary | ICD-10-CM

## 2025-01-06 NOTE — TELEPHONE ENCOUNTER
VM took doxycycline 5 days for infection.  Still not cleared up.  Req refill.   No further details.

## 2025-01-07 RX ORDER — AZITHROMYCIN 250 MG/1
TABLET, FILM COATED ORAL
Qty: 6 TABLET | Refills: 0 | Status: SHIPPED | OUTPATIENT
Start: 2025-01-07 | End: 2025-01-12

## 2025-01-15 DIAGNOSIS — D86.85 SARCOID MYOCARDITIS (HHS-HCC): ICD-10-CM

## 2025-01-15 DIAGNOSIS — R45.86 MOOD CHANGE: ICD-10-CM

## 2025-01-15 RX ORDER — SERTRALINE HYDROCHLORIDE 50 MG/1
50 TABLET, FILM COATED ORAL DAILY
Qty: 90 TABLET | Refills: 3 | Status: SHIPPED | OUTPATIENT
Start: 2025-01-15

## 2025-01-22 RX ORDER — PREDNISONE 10 MG/1
10 TABLET ORAL DAILY
Qty: 30 TABLET | Refills: 1 | Status: SHIPPED | OUTPATIENT
Start: 2025-01-22

## 2025-01-23 ENCOUNTER — TELEPHONE (OUTPATIENT)
Dept: PULMONOLOGY | Facility: HOSPITAL | Age: 60
End: 2025-01-23
Payer: COMMERCIAL

## 2025-01-23 DIAGNOSIS — D86.0 PULMONARY SARCOIDOSIS (MULTI): ICD-10-CM

## 2025-01-23 RX ORDER — METHOTREXATE 2.5 MG/1
15 TABLET ORAL
Qty: 24 TABLET | Refills: 11 | Status: SHIPPED | OUTPATIENT
Start: 2025-01-26

## 2025-01-23 NOTE — TELEPHONE ENCOUNTER
Pt reached out needing a refill on his methotrexate. Refill order placed and routed to Dr. Goodrich to sign. Pt is also due for blood work and orders were placed. Pt was updated via [employer] message.

## 2025-01-28 ENCOUNTER — PATIENT MESSAGE (OUTPATIENT)
Dept: PRIMARY CARE | Facility: CLINIC | Age: 60
End: 2025-01-28
Payer: COMMERCIAL

## 2025-01-28 ENCOUNTER — LAB (OUTPATIENT)
Dept: LAB | Facility: HOSPITAL | Age: 60
End: 2025-01-28
Payer: COMMERCIAL

## 2025-01-28 DIAGNOSIS — D86.0 PULMONARY SARCOIDOSIS (MULTI): ICD-10-CM

## 2025-01-28 LAB
ALBUMIN SERPL-MCNC: 4.2 G/DL (ref 3.6–5.1)
ALBUMIN/GLOB SERPL: 1.9 (CALC) (ref 1–2.5)
ALP SERPL-CCNC: 53 U/L (ref 35–144)
ALT SERPL-CCNC: 26 U/L (ref 9–46)
AST SERPL-CCNC: 17 U/L (ref 10–35)
BASOPHILS # BLD AUTO: 33 CELLS/UL (ref 0–200)
BASOPHILS NFR BLD AUTO: 0.5 %
BILIRUB DIRECT SERPL-MCNC: 0.1 MG/DL
BILIRUB INDIRECT SERPL-MCNC: 0.4 MG/DL (CALC) (ref 0.2–1.2)
BILIRUB SERPL-MCNC: 0.5 MG/DL (ref 0.2–1.2)
EOSINOPHIL # BLD AUTO: 202 CELLS/UL (ref 15–500)
EOSINOPHIL NFR BLD AUTO: 3.1 %
ERYTHROCYTE [DISTWIDTH] IN BLOOD BY AUTOMATED COUNT: 13.1 % (ref 11–15)
GLOBULIN SER CALC-MCNC: 2.2 G/DL (CALC) (ref 1.9–3.7)
HCT VFR BLD AUTO: 46.8 % (ref 38.5–50)
HGB BLD-MCNC: 15.7 G/DL (ref 13.2–17.1)
LYMPHOCYTES # BLD AUTO: 2165 CELLS/UL (ref 850–3900)
LYMPHOCYTES NFR BLD AUTO: 33.3 %
MCH RBC QN AUTO: 31.2 PG (ref 27–33)
MCHC RBC AUTO-ENTMCNC: 33.5 G/DL (ref 32–36)
MCV RBC AUTO: 93 FL (ref 80–100)
MONOCYTES # BLD AUTO: 702 CELLS/UL (ref 200–950)
MONOCYTES NFR BLD AUTO: 10.8 %
NEUTROPHILS # BLD AUTO: 3400 CELLS/UL (ref 1500–7800)
NEUTROPHILS NFR BLD AUTO: 52.3 %
PLATELET # BLD AUTO: 196 THOUSAND/UL (ref 140–400)
PMV BLD REES-ECKER: 9.6 FL (ref 7.5–12.5)
PROT SERPL-MCNC: 6.4 G/DL (ref 6.1–8.1)
RBC # BLD AUTO: 5.03 MILLION/UL (ref 4.2–5.8)
WBC # BLD AUTO: 6.5 THOUSAND/UL (ref 3.8–10.8)

## 2025-01-29 LAB
ALBUMIN SERPL-MCNC: 3.4 G/DL (ref 3.6–5.1)
ALBUMIN/GLOB SERPL: 1.2 (CALC) (ref 1–2.5)
ALP SERPL-CCNC: 154 U/L (ref 35–144)
ALT SERPL-CCNC: 14 U/L (ref 9–46)
AST SERPL-CCNC: 12 U/L (ref 10–35)
BASOPHILS # BLD AUTO: 35 CELLS/UL (ref 0–200)
BASOPHILS NFR BLD AUTO: 0.3 %
BILIRUB DIRECT SERPL-MCNC: 0.1 MG/DL
BILIRUB INDIRECT SERPL-MCNC: 0.2 MG/DL (CALC) (ref 0.2–1.2)
BILIRUB SERPL-MCNC: 0.3 MG/DL (ref 0.2–1.2)
EOSINOPHIL # BLD AUTO: 426 CELLS/UL (ref 15–500)
EOSINOPHIL NFR BLD AUTO: 3.7 %
ERYTHROCYTE [DISTWIDTH] IN BLOOD BY AUTOMATED COUNT: 11.6 % (ref 11–15)
GLOBULIN SER CALC-MCNC: 2.8 G/DL (CALC) (ref 1.9–3.7)
HCT VFR BLD AUTO: 34.4 % (ref 38.5–50)
HGB BLD-MCNC: 11.8 G/DL (ref 13.2–17.1)
LYMPHOCYTES # BLD AUTO: 2392 CELLS/UL (ref 850–3900)
LYMPHOCYTES NFR BLD AUTO: 20.8 %
MCH RBC QN AUTO: 32.5 PG (ref 27–33)
MCHC RBC AUTO-ENTMCNC: 34.3 G/DL (ref 32–36)
MCV RBC AUTO: 94.8 FL (ref 80–100)
MONOCYTES # BLD AUTO: 828 CELLS/UL (ref 200–950)
MONOCYTES NFR BLD AUTO: 7.2 %
NEUTROPHILS # BLD AUTO: 7820 CELLS/UL (ref 1500–7800)
NEUTROPHILS NFR BLD AUTO: 68 %
PLATELET # BLD AUTO: 231 THOUSAND/UL (ref 140–400)
PMV BLD REES-ECKER: 12.8 FL (ref 7.5–12.5)
PROT SERPL-MCNC: 6.2 G/DL (ref 6.1–8.1)
RBC # BLD AUTO: 3.63 MILLION/UL (ref 4.2–5.8)
WBC # BLD AUTO: 11.5 THOUSAND/UL (ref 3.8–10.8)

## 2025-02-03 ENCOUNTER — APPOINTMENT (OUTPATIENT)
Dept: CARDIOLOGY | Facility: CLINIC | Age: 60
End: 2025-02-03
Payer: COMMERCIAL

## 2025-02-03 VITALS
DIASTOLIC BLOOD PRESSURE: 65 MMHG | OXYGEN SATURATION: 95 % | HEIGHT: 69 IN | SYSTOLIC BLOOD PRESSURE: 104 MMHG | RESPIRATION RATE: 16 BRPM | HEART RATE: 84 BPM | BODY MASS INDEX: 29.68 KG/M2 | WEIGHT: 200.4 LBS

## 2025-02-03 DIAGNOSIS — D86.85 CARDIAC SARCOIDOSIS: ICD-10-CM

## 2025-02-03 DIAGNOSIS — I42.8 NONISCHEMIC CARDIOMYOPATHY (MULTI): ICD-10-CM

## 2025-02-03 DIAGNOSIS — I48.0 PAROXYSMAL ATRIAL FIBRILLATION (MULTI): ICD-10-CM

## 2025-02-03 DIAGNOSIS — E78.5 DYSLIPIDEMIA: ICD-10-CM

## 2025-02-03 DIAGNOSIS — I50.20 ACC/AHA STAGE C SYSTOLIC HEART FAILURE: Primary | ICD-10-CM

## 2025-02-03 PROCEDURE — 1036F TOBACCO NON-USER: CPT | Performed by: INTERNAL MEDICINE

## 2025-02-03 PROCEDURE — 99214 OFFICE O/P EST MOD 30 MIN: CPT | Performed by: INTERNAL MEDICINE

## 2025-02-03 PROCEDURE — 3008F BODY MASS INDEX DOCD: CPT | Performed by: INTERNAL MEDICINE

## 2025-02-03 NOTE — PATIENT INSTRUCTIONS
It was a pleasure seeing you today. Please contact myself or my team with any questions.     To reach Dr. De La Vega' office please call [phone] (Monalisa).   Fax: [phone]   To schedule an appointment call [phone]     If you have any questions or need cardiac medication refills, please call the Heart Failure office at [phone], option 6. You may also contact the  Heart Failure Nursing team via email at [email or web address] (Please include your name and date of birth).        1) Continue your current medications  2) We will schedule a CPET (stress test) to be scheduled on a Thursday; I will see you the same day.  3) We will schedule a repeat echocardiogram.

## 2025-02-03 NOTE — PROGRESS NOTES
[facility]  Primary Care Physician: Adrian Li MD  Referring Provider/Cardiologist: Michael     Date of Visit: 2025  3:40 PM EST  Location of visit: [address]     HPI:   Mr. Song is a 59M with a PMHx sig for stage C systolic HF/NICM (sarcoid)/HFrEF with severe LV dysfunction and high grade AVB (Mobitz II) from suspected sarcoidosis s/p CRT-D who returns to the Advanced Heart Failure clinic for ongoing evaluation and management.    Interval hx:   At the current time he reports ongoing exertional dyspnea. He denies chest pain, pressure, palpitations, or LE edema.     Hospitalizations: Denies         SocHx:   , lives with long time girlfriend in [place]. Works full time as bread /salesman.  Denies tobacco/illicits, rare etOH    FamHx:  Mother: HTN, Afib, [place], HFpEF, living age 81/82  Father: CAD s/p CABG x 4 in 70's, living age 75  Oldest Brother: CAD, recent cath, needs PCI, living age 60  Paternal grandmother:  of heart problems  Paternal Uncles x 3:  of MI's  Maternal grandfather: Pacemaker  Paternal grandmother: CVA,  from MI later       Current Outpatient Medications   Medication Sig Dispense Refill    apixaban (Eliquis) 5 mg tablet Take 1 tablet (5 mg) by mouth 2 times a day. 180 tablet 3    cholecalciferol (Vitamin D-3) 5,000 Units tablet Take 1 tablet (5,000 Units) by mouth once daily.      Entresto 24-26 mg tablet Take 1 tablet by mouth 2 times a day. 180 tablet 3    fluticasone (Flovent Diskus) 50 mcg/actuation diskus inhaler Inhale 1 puff 2 times a day. Rinse mouth with water after use to reduce aftertaste and incidence of candidiasis. Do not swallow. 60 each 3    folic acid (Folvite) 1 mg tablet Take 1 tablet (1 mg) by mouth once daily. 30 tablet 11    Jardiance 10 mg Take 1 tablet (10 mg) by mouth once daily. 42 tablet 0    methotrexate (Trexall) 2.5 mg tablet Take 6 tablets (15 mg total) by mouth  "1 (one) time per week.  Follow directions carefully, and ask to explain any part you do not understand. Take exactly as directed. 24 tablet 11    metoprolol succinate XL (Toprol-XL) 25 mg 24 hr tablet Take 1 tablet (25 mg) by mouth once daily. Do not crush or chew. 30 tablet 11    predniSONE (Deltasone) 10 mg tablet TAKE 1 TABLET BY MOUTH ONCE DAILY 30 tablet 1    rosuvastatin (Crestor) 20 mg tablet Take 1 tablet (20 mg) by mouth once daily. 90 tablet 2    sertraline (Zoloft) 50 mg tablet TAKE 1 TABLET BY MOUTH ONCE DAILY 90 tablet 3    spironolactone (Aldactone) 25 mg tablet Take 0.5 tablets (12.5 mg) by mouth once daily. 45 tablet 3    valACYclovir (Valtrex) 500 mg tablet Take 1 tablet (500 mg) by mouth once daily as needed (As needed for flare ups). 30 tablet 0     No current facility-administered medications for this visit.       Allergies   Allergen Reactions    House Dust Runny nose     asthma       Visit Vitals  /65   Pulse 84   Resp 16   Ht 1.753 m (5' 9\")   Wt 90.9 kg (200 lb 6.4 oz)   SpO2 95%   BMI 29.59 kg/m²   Smoking Status Never   BSA 2.1 m²        Physical Exam:  On exam Mr. Song appears his stated age, is alert and oriented x3, and in no acute distress. His sclera are anicteric and his oropharynx has moist mucous membranes. His neck is supple and without thyromegaly. The JVP is ~7 cm of water above the right atrium. His cardiac exam has regular rhythm, normal S1, S2. No S3/4. There are no murmurs. His lungs are clear to auscultation bilaterally and there is no dullness to percussion. His abdomen is soft, nontender with normoactive bowel sounds. There is no HJR. The extremities are warm and without edema. The skin is dry. There is no rash present. The distal pulses are 2+ in all four extremities. His mood and affect are appropriate for todays encounter.      Cardiac Labs/Diagnostics:    Lab Results   Component Value Date    CREATININE 1.16 11/18/2024    BUN 21 11/18/2024     11/18/2024 "    K 3.9 11/18/2024     (H) 11/18/2024    CO2 28 11/18/2024        Recent Labs     11/18/24  0809 10/23/23  1530 12/21/22  0920 09/15/21  1104 03/07/21  0647   CHOL 162 277* 188 140 154   LDLF  --   --  119* 77 98   LDLCALC 86 172*  --   --   --    HDL 56.1 86.3 56.1 53.0 38.0*   TRIG 99 96 64 51 92       Recent Labs     07/19/23  0850 02/25/22  1126   * 151*         PET (10/15/24):  1. Newly developed small-sized moderate perfusion defects involving the mid to basal anterior wall. Additionally, Diffuse FDG uptake throughout the left ventricle myocardium, suggestive of suboptimal fasting. Consider repeat examination as clinically indicated.  2. Moderate dilatation of left ventricle.  3.  Gated imaging demonstrates global hypokinesis with a LV ejection fraction calculated at 41%, previously 34%.  4. No other concerning FDG uptake throughout the body to suggest sarcoid involvement.    ECG (10/7/24):  A-V paced (HR 74)    Echo (1/15/24):  1. Left ventricular systolic function is severely decreased with a 25% estimated ejection fraction.  2. Multiple segmental abnormalities exist. See findings.  3. Abnormal septal motion consistent with RV pacemaker.  4. Spectral Doppler shows an impaired relaxation pattern of left ventricular diastolic filling.  5. The left atrium is moderately dilated.  6. RVSP within normal limits.  7. Compared to 7/2023 no change is seen.  8. There are multiple wall motion abnormalities.    Echo (7/10/23):  1. Left ventricular systolic function is severely decreased with a 20-25% estimated ejection fraction. 3D with contrat ej fx 21%, posterior akinenitic scar, severe dilation, no thrombus  2. Left ventricular cavity size is severely dilated.  3. There are multiple wall motion abnormalities.  4. Multiple segmental abnormalities exist. See findings.  5. There is low normal right ventricular systolic function.  6. Aortic valve sclerosis.  7. Moderate mitral valve regurgitation.    ECG  (3/28/23):  A-V paced (HR 74)    PET scan (3/2/23):  1. Assuming appropriate fasting, there is increased metabolic activity along the basal 2/3 of the anterior, lateral, inferior, and septal left ventricular wall consistent with an inflammatory process such as myocarditis or sarcoidosis.  2. Perfusion defect in the inferior 1/3 of the inferior-septal wall, and a small territory of perfusion defect in the inferior apex, suggestive of myocardial scarring.  3. Cardiomegaly with decreased ejection fraction at 34% (normal above 45%).  4. Hypermetabolic left hilar, left paratracheal, and right paratracheal lymph nodes are consistent with sarcoidosis involvement.    cMRI (4/5/22):  1. Biventricular Cardiomyopathy.  Differential includes but is not limited to cardiac sarcoidosis, arrhythmogenic rightventricular cardiomyopathy, hypertrophic cardiomyopathy. The findings of this study are consistent with a single major CMR criteria for the diagnosis of arrhythmogenic right ventricular cardiomyopathy per the 2010 Task Force criteria(focal RV dyskinesis and RV EF </= 40%).  2. Extensive confluent hyperenhancement the basal/mid inferior and inferior septal walls with partial sparing the left ventricular basal/mid subendocardial myocardium. Subendocardial hyperenhancement of the basal/mid inferior and lateral right ventricular myocardium.  3. T2 STIR: Increased signal intensity of the mid basal inferior septum and lateral RV myocardium. Finding consistent with myocardial inflammation/edema.  4. Asymmetric septal hypertrophy. Maximal septal thickness mid inferior septum 1.3 cm.  5. Dilated left ventricular cavity size with moderately depressed systolic function. Ejection fraction 37%.  6. Dilated right ventricular cavity size with mild-moderate depressed RV systolic function. RV EF 40%. Focal dyskinesis of the basal inferior wall.  7. Compared to previous cardiac MRI 6/29/21 there is an increase in left ventricular and right  ventricular cavity volumes and decrease in left ventricular and right ventricular systolic function.    cMRI (6/29/21):  1. The findings of this study are suggestive of cardiac sarcoidosis. Differential would also include but is not limited to hypertrophic cardiomyopathy, arrhythmogenic right ventricular cardiomyopathy.  2. Extensive confluent hyperenhancement the basal/mid inferior and inferior septal walls with partial sparing the left ventricular basal/mid subendocardial myocardium. Subendocardial hyperenhancement of the basal/mid inferior right ventricular myocardium  3. T2 STIR: Increased signal intensity of the mid basal inferior septum. Finding consistent with myocardial inflammation/edema.  4. Asymmetric septal hypertrophy. Maximal septal thickness mid inferior septum 1.9 cm.  5. Normal left ventricular cavity size with moderately depressed systolic function. Ejection fraction 42%.  6. Normal right ventricular cavity size with mild-moderate depressed RV systolic function. RV EF 45%. Focal dyskinesis of the basal inferior wall.    Cardiac cath (3/15/21):  Mild CAD.     Echo (3/6/21):  1. The left ventricular systolic function is mildly decreased.  2. Multiple segmental abnormalities exist. See findings.  3. Aortic valve stenosis is not present.  4. There is global hypokinesis of the left ventricle with minor regional variations.      Impression/Plan:  Mr. Song is a 59M with a PMHx sig for stage C systolic HF/NICM (sarcoid)/HFrEF with severe LV dysfunction and high grade AVB (Mobitz II) from suspected sarcoidosis s/p CRT-D who returns to the Advanced Heart Failure clinic for ongoing evaluation and management. At the current time he has functional class III symptoms and appears euvolemic on exam.     1) High grade AVB (Mobitz II) s/p dc-ppm secondary to sarcoidosis now s/p CRT-D  cMRI with LV dysfunction and findings consistent with cardiac sarcoidosis, including both scar and inflammation. Remains on  methotrexate and prednisone.  -c/w prednisone and methotrexate; will f/u with Dr. Goodrich    2) Stage C chronic systolic HF/NICM (sarcoid)/HFrEF with severe LV dysfunction (LVEF 35-40-->25%; 1/2024) s/p CRT-D  Most recent echo with persistent LV dysfunction, although the most recent PET reports an EF of 41%. He reports ongoing exertional dyspnea despite his GDMT and treatment for sarcoid.   -c/w metoprolol succinate 25 mg daily, entresto 24/26 mg bid, jardiance 10 mg daily, spironolactone 12.5 mg daily   -repeat an echo to assess EF  -refer for a CPET given persistent dyspnea    3) pAF on DOAC therapy  -c/w metoprolol, eliquis 5 mg bid  -f/u with EP    4) Dyslipidemia  Lipids (11/2024): tChol 162, HDL 56, LDL 86, Trigs 99  -c/w rosuvastatin 20 mg daily      F/U: at the time of his CPET at /Adina 1800    ____________________________________________________________  Jh De La Vega DO  Section of Advanced Heart Failure and Cardiac Transplantation  Division of Cardiovascular Medicine  [facility]

## 2025-02-11 ENCOUNTER — APPOINTMENT (OUTPATIENT)
Dept: CARDIOLOGY | Facility: CLINIC | Age: 60
End: 2025-02-11
Payer: COMMERCIAL

## 2025-02-13 ENCOUNTER — TELEPHONE (OUTPATIENT)
Dept: PULMONOLOGY | Facility: HOSPITAL | Age: 60
End: 2025-02-13
Payer: COMMERCIAL

## 2025-02-13 DIAGNOSIS — D86.0 PULMONARY SARCOIDOSIS (MULTI): ICD-10-CM

## 2025-02-13 NOTE — TELEPHONE ENCOUNTER
Pt reached out regarding his labs. He stated that he just got them drawn and would like to know when they are due next. Pt is due for labs around 4/28/2025 and he was updated via [employer] message. Orders were also placed.

## 2025-02-24 ENCOUNTER — HOSPITAL ENCOUNTER (OUTPATIENT)
Dept: CARDIOLOGY | Facility: HOSPITAL | Age: 60
Discharge: HOME | End: 2025-02-24
Payer: COMMERCIAL

## 2025-02-24 DIAGNOSIS — I50.20 ACC/AHA STAGE C SYSTOLIC HEART FAILURE: ICD-10-CM

## 2025-02-24 DIAGNOSIS — I50.41 ACUTE COMBINED SYSTOLIC (CONGESTIVE) AND DIASTOLIC (CONGESTIVE) HEART FAILURE: ICD-10-CM

## 2025-02-24 DIAGNOSIS — D86.85 CARDIAC SARCOIDOSIS: ICD-10-CM

## 2025-02-24 LAB
AORTIC VALVE PEAK VELOCITY: 1.41 M/S
AV PEAK GRADIENT: 8 MMHG
AVA (PEAK VEL): 2.37 CM2
EJECTION FRACTION APICAL 4 CHAMBER: 26.9
EJECTION FRACTION: 34 %
LEFT ATRIUM VOLUME AREA LENGTH INDEX BSA: 25.9 ML/M2
LEFT VENTRICLE INTERNAL DIMENSION DIASTOLE: 6.08 CM (ref 3.5–6)
LEFT VENTRICULAR OUTFLOW TRACT DIAMETER: 2.15 CM
LV EJECTION FRACTION BIPLANE: 34 %
MITRAL VALVE E/A RATIO: 0.8
RIGHT VENTRICLE FREE WALL PEAK S': 11.38 CM/S
RIGHT VENTRICLE PEAK SYSTOLIC PRESSURE: 15.2 MMHG
TRICUSPID ANNULAR PLANE SYSTOLIC EXCURSION: 1.9 CM

## 2025-02-24 PROCEDURE — 93306 TTE W/DOPPLER COMPLETE: CPT

## 2025-02-24 PROCEDURE — 93306 TTE W/DOPPLER COMPLETE: CPT | Performed by: INTERNAL MEDICINE

## 2025-03-10 ENCOUNTER — HOSPITAL ENCOUNTER (OUTPATIENT)
Dept: CARDIOLOGY | Facility: HOSPITAL | Age: 60
Discharge: HOME | End: 2025-03-10
Payer: COMMERCIAL

## 2025-03-10 DIAGNOSIS — Z95.810 PRESENCE OF AUTOMATIC CARDIOVERTER/DEFIBRILLATOR (AICD): ICD-10-CM

## 2025-03-10 DIAGNOSIS — I42.9 CARDIOMYOPATHY, UNSPECIFIED TYPE (MULTI): ICD-10-CM

## 2025-03-10 PROCEDURE — 93296 REM INTERROG EVL PM/IDS: CPT

## 2025-03-13 ENCOUNTER — CLINICAL SUPPORT (OUTPATIENT)
Dept: CARDIAC REHAB | Facility: HOSPITAL | Age: 60
End: 2025-03-13
Payer: COMMERCIAL

## 2025-03-13 VITALS
RESPIRATION RATE: 6 BRPM | SYSTOLIC BLOOD PRESSURE: 100 MMHG | HEART RATE: 74 BPM | OXYGEN SATURATION: 100 % | BODY MASS INDEX: 28.57 KG/M2 | HEIGHT: 69 IN | WEIGHT: 192.9 LBS | DIASTOLIC BLOOD PRESSURE: 60 MMHG

## 2025-03-13 DIAGNOSIS — I50.20 ACC/AHA STAGE C SYSTOLIC HEART FAILURE: ICD-10-CM

## 2025-03-13 DIAGNOSIS — D86.85 CARDIAC SARCOIDOSIS: ICD-10-CM

## 2025-03-13 PROCEDURE — 94621 CARDIOPULM EXERCISE TESTING: CPT

## 2025-03-13 PROCEDURE — 94621 CARDIOPULM EXERCISE TESTING: CPT | Performed by: INTERNAL MEDICINE

## 2025-03-13 NOTE — PROGRESS NOTES
Cardiopulmonary (Metabolic) Stress Test    PATIENT: Adams Song  DATE: 3/13/2025  AGE/: 59 y.o./1965  REFERRING PHYSICIAN: Jh De La Vega DO    Cardiopulmonary Stress Test was completed today in Cardiopulmonary Stress Lab at [facility]. Correct procedure and correct patient verified verbally and with ID Band checked.    Vitals:    25 1300   BP: 100/60   Pulse: 74   Resp: (!) 6   SpO2: 100%     Vitals:    25 1300   Weight: 87.5 kg (192 lb 14.4 oz)     Body mass index is 28.47 kg/m².    Please see complete testing results for order in [employer] reporting with final physician interpretation.  Patient has met discharge criteria and has been discharged to home.    CHELSIE Cai, CEP, CCT

## 2025-03-20 ENCOUNTER — APPOINTMENT (OUTPATIENT)
Dept: CARDIAC REHAB | Facility: HOSPITAL | Age: 60
End: 2025-03-20
Payer: COMMERCIAL

## 2025-03-20 ENCOUNTER — APPOINTMENT (OUTPATIENT)
Dept: CARDIOLOGY | Facility: HOSPITAL | Age: 60
End: 2025-03-20
Payer: COMMERCIAL

## 2025-03-24 ENCOUNTER — APPOINTMENT (OUTPATIENT)
Dept: CARDIOLOGY | Facility: CLINIC | Age: 60
End: 2025-03-24
Payer: COMMERCIAL

## 2025-03-24 VITALS
BODY MASS INDEX: 29.33 KG/M2 | DIASTOLIC BLOOD PRESSURE: 84 MMHG | HEART RATE: 96 BPM | SYSTOLIC BLOOD PRESSURE: 128 MMHG | HEIGHT: 69 IN | WEIGHT: 198 LBS

## 2025-03-24 DIAGNOSIS — Z78.9 NEVER SMOKED CIGARETTES: ICD-10-CM

## 2025-03-24 DIAGNOSIS — I47.29 NONSUSTAINED VENTRICULAR TACHYCARDIA (MULTI): ICD-10-CM

## 2025-03-24 DIAGNOSIS — Z95.810 PRESENCE OF AUTOMATIC CARDIOVERTER/DEFIBRILLATOR (AICD): Primary | ICD-10-CM

## 2025-03-24 DIAGNOSIS — I44.2 COMPLETE HEART BLOCK: ICD-10-CM

## 2025-03-24 DIAGNOSIS — D86.85 CARDIAC SARCOIDOSIS: ICD-10-CM

## 2025-03-24 DIAGNOSIS — I50.20 SYSTOLIC HEART FAILURE, ACC/AHA STAGE C: ICD-10-CM

## 2025-03-24 DIAGNOSIS — Z95.810 BIVENTRICULAR ICD (IMPLANTABLE CARDIOVERTER-DEFIBRILLATOR) IN PLACE: ICD-10-CM

## 2025-03-24 DIAGNOSIS — E78.2 MIXED HYPERLIPIDEMIA: ICD-10-CM

## 2025-03-24 DIAGNOSIS — T82.198A MECHANICAL COMPLICATION OF IMPLANTABLE CARDIOVERTER-DEFIBRILLATOR (ICD): ICD-10-CM

## 2025-03-24 DIAGNOSIS — I48.0 PAROXYSMAL ATRIAL FIBRILLATION WITH RAPID VENTRICULAR RESPONSE (MULTI): ICD-10-CM

## 2025-03-24 PROBLEM — Z95.0 CARDIAC PACEMAKER: Status: RESOLVED | Noted: 2023-09-04 | Resolved: 2025-03-24

## 2025-03-24 PROCEDURE — 3008F BODY MASS INDEX DOCD: CPT | Performed by: INTERNAL MEDICINE

## 2025-03-24 PROCEDURE — 93284 PRGRMG EVAL IMPLANTABLE DFB: CPT | Performed by: INTERNAL MEDICINE

## 2025-03-24 PROCEDURE — 1036F TOBACCO NON-USER: CPT | Performed by: INTERNAL MEDICINE

## 2025-03-24 PROCEDURE — 93000 ELECTROCARDIOGRAM COMPLETE: CPT | Mod: DISTINCT PROCEDURAL SERVICE | Performed by: INTERNAL MEDICINE

## 2025-03-24 PROCEDURE — 99215 OFFICE O/P EST HI 40 MIN: CPT | Performed by: INTERNAL MEDICINE

## 2025-03-24 ASSESSMENT — ENCOUNTER SYMPTOMS
DYSPNEA ON EXERTION: 0
PALPITATIONS: 0

## 2025-03-24 NOTE — PATIENT INSTRUCTIONS
Continue same medications/treatment.  Patient educated on proper medication use.  Patient educated on risk factor modification.  Please bring any lab results from other providers/physicians to your next appointment.    Please bring all medicines, vitamins, and herbal supplements with you when you come to the office.    Prescriptions will not be filled unless you are compliant with your follow up appointments or have a follow up appointment scheduled as per instruction of your physician. Refills should be requested at the time of your visit.    SCHEDULE in clinic device check in 6 months  Follow up with Dr. Helm in 1 year with device check  Continue remote checks at 3 and 9 months    MIGUELITO PERALES RN, AM SCRIBING FOR AND IN THE PRESENCE OF DR. WALTER HELM MD, FACC, FACP, FHRS

## 2025-03-24 NOTE — PROGRESS NOTES
"Chief Complaint:   Follow-up and Device Check     History Of Present Illness:    Adams Song is a 59 y.o. male presenting with steps in [facility], as Dr. Rodriguez retired.  Patient denies any arrhythmia symptoms of palpitation, lightheadedness, near syncope, or syncope.    He inquires about his ejection fraction.  We reviewed his last echocardiogram.         Last Recorded Vitals:  Vitals:    03/24/25 1550   BP: 128/84   BP Location: Left arm   Patient Position: Sitting   Pulse: 96   Weight: 89.8 kg (198 lb)   Height: 1.753 m (5' 9.02\")       Past Medical History:  See List    Past Surgical History:  See List      Social History:  He reports that he has never smoked. He has never used smokeless tobacco. He reports that he does not drink alcohol and does not use drugs.    Family History:  Family History   Problem Relation Name Age of Onset    Atrial fibrillation Mother      Heart disease Father      Other (CABG) Father          Allergies:  House dust    Outpatient Medications:  Current Outpatient Medications   Medication Instructions    apixaban (ELIQUIS) 5 mg, oral, 2 times daily    cholecalciferol (Vitamin D-3) 5,000 Units tablet 1 tablet, Daily    Entresto 24-26 mg tablet 1 tablet, oral, 2 times daily    fluticasone (Flovent Diskus) 50 mcg/actuation diskus inhaler 1 puff, inhalation, 2 times daily RT, Rinse mouth with water after use to reduce aftertaste and incidence of candidiasis. Do not swallow.    folic acid (FOLVITE) 1 mg, oral, Daily    Jardiance 10 mg, oral, Daily    methotrexate (TREXALL) 15 mg, oral, Once Weekly, Follow directions carefully, and ask to explain any part you do not understand. Take exactly as directed.    metoprolol succinate XL (TOPROL-XL) 25 mg, oral, Daily, Do not crush or chew.    predniSONE (DELTASONE) 10 mg, oral, Daily    rosuvastatin (CRESTOR) 20 mg, oral, Daily    spironolactone (ALDACTONE) 12.5 mg, oral, Daily    valACYclovir (VALTREX) 500 mg, oral, Daily PRN   Review of Systems "   Cardiovascular:  Negative for chest pain, dyspnea on exertion and palpitations.   All other systems reviewed and are negative.        Physical Exam:  Constitutional:       General: Awake.      Appearance: Normal and healthy appearance. Well-developed and not in distress.   Neck:      Vascular: No JVR. JVD normal.   Pulmonary:      Effort: Pulmonary effort is normal.      Breath sounds: Normal breath sounds. No wheezing. No rhonchi. No rales.   Chest:      Chest wall: Not tender to palpatation.      Comments: Left sided device pocket- healed and well approximated. No swelling or hematoma      Cardiovascular:      PMI at left midclavicular line. Normal rate. Regular rhythm. Normal S1. Normal S2.       Murmurs: There is no murmur.      No gallop.  No click. No rub.   Pulses:     Intact distal pulses.   Edema:     Peripheral edema absent.   Abdominal:      Tenderness: There is no abdominal tenderness.   Musculoskeletal: Normal range of motion.         General: No tenderness. Skin:     General: Skin is warm and dry.   Neurological:      General: No focal deficit present.      Mental Status: Alert and oriented to person, place and time.            Last Labs:  CBC -  Lab Results   Component Value Date    WBC 6.5 01/28/2025    HGB 15.7 01/28/2025    HCT 46.8 01/28/2025    MCV 93.0 01/28/2025     01/28/2025       CMP -  Lab Results   Component Value Date    CALCIUM 8.8 11/18/2024    PHOS 4.0 07/22/2023    PROT 6.4 01/28/2025    ALBUMIN 4.2 01/28/2025    AST 17 01/28/2025    ALT 26 01/28/2025    ALKPHOS 53 01/28/2025    BILITOT 0.5 01/28/2025       LIPID PANEL -   Lab Results   Component Value Date    CHOL 162 11/18/2024    TRIG 99 11/18/2024    HDL 56.1 11/18/2024    CHHDL 2.9 11/18/2024    LDLF 119 (H) 12/21/2022    VLDL 20 11/18/2024    NHDL 106 11/18/2024       RENAL FUNCTION PANEL -   Lab Results   Component Value Date    GLUCOSE 106 (H) 11/18/2024     11/18/2024    K 3.9 11/18/2024     (H)  11/18/2024    CO2 28 11/18/2024    ANIONGAP 9 (L) 11/18/2024    BUN 21 11/18/2024    CREATININE 1.16 11/18/2024    GFRMALE 87 07/22/2023    CALCIUM 8.8 11/18/2024    PHOS 4.0 07/22/2023    ALBUMIN 4.2 01/28/2025        Lab Results   Component Value Date     (H) 07/19/2023    HGBA1C 5.4 11/18/2024       Last Cardiology Tests:  ECG:  ECG 12 Lead 10/07/2024    Today.  Normal sinus rhythm.  Appropriate atrial and biventricular pacing.  Right axis deviation.  Paced QT interval 440 ms    Device check today.  Medtronic DTPA 2QQ BiV ICD.  On field alert.  Initial encounter.  Assessment underway device over 6 years.  94% BiV pacing.  0% A-fib.  0 VT  Echo:  Transthoracic echo (TTE) complete 02/24/2025      Ejection Fractions:  EF   Date/Time Value Ref Range Status   02/24/2025 01:51 PM 34 %        Stress Test:  Cardiopulmonary (Metabolic) Stress Test 03/13/2025      Cardiac Imaging:  MR CARDIAC RESONANCE IMAGING FOR VELOCITY FLOW MAPPING 04/05/2022        Lab review: I have personally reviewed the laboratory result(s) see above    Assessment/Plan   Diagnoses and all orders for this visit:  Presence of automatic cardioverter/defibrillator (AICD)  Biventricular ICD (implantable cardioverter-defibrillator) in place  Cardiac sarcoidosis  Complete heart block  Mixed hyperlipidemia  Nonsustained ventricular tachycardia (Multi)  Paroxysmal atrial fibrillation with rapid ventricular response (Multi)  Systolic heart failure, ACC/AHA stage C  BMI 29.0-29.9,adult  Never smoked cigarettes  Mechanical complication of implantable cardioverter-defibrillator (ICD)        Tracy Lemon RN    Cardiac sarcoidosis, high AV block, nonsustained VT, and left ventricular systolic dysfunction.  Nonischemic cardiomyopathy with chronic systolic heart failure and LVEF improved to 34%.  Awaiting C PET.  Follows with heart failure service  Status post Medtronic DDDR pacemaker in March 2021 to treat AV block, with upgrade to biventricular DDDR  ICD in July 2023 for primary prevention for sudden death because of concomitant ventricular arrhythmias.    Medtronic DTPA 2QQ cobalt BiV ICD on field alert, initial encounter for mechanical complication of defibrillator.  Discussed data.  Device.  Discussed field alert.  Ordered device checks.  Paroxysmal atrial fibrillation, and currently normal sinus rhythm.  [id number] 1 (CHF). Tolerating anticoagulation.  High risk medication Eliquis.  Continue long-term    Counseling greater than 50% of visit performed.  The patient and I discussed normal conduction, heart block, ventricular tachycardia, sarcoid, ejection fraction, last echocardiogram, heart failure office visit note, ECG, device check, field alert device initial encounter, standard care for device follow-up, shared decision making, treatment options, risk, benefits, and imponderables.  All questions answered in detail.  Patient appreciative of care.    Total 46, inclusive of review of device checks, serial echocardiograms, heart failure office visit note, EP office visit note, and cardiology evaluation

## 2025-03-25 DIAGNOSIS — D86.85 SARCOID MYOCARDITIS (HHS-HCC): ICD-10-CM

## 2025-03-27 ENCOUNTER — APPOINTMENT (OUTPATIENT)
Dept: CARDIAC REHAB | Facility: HOSPITAL | Age: 60
End: 2025-03-27
Payer: COMMERCIAL

## 2025-03-27 ENCOUNTER — APPOINTMENT (OUTPATIENT)
Dept: CARDIOLOGY | Facility: HOSPITAL | Age: 60
End: 2025-03-27
Payer: COMMERCIAL

## 2025-03-27 DIAGNOSIS — E78.2 MIXED HYPERLIPIDEMIA: ICD-10-CM

## 2025-03-27 RX ORDER — ROSUVASTATIN CALCIUM 20 MG/1
20 TABLET, COATED ORAL DAILY
Qty: 90 TABLET | Refills: 3 | Status: SHIPPED | OUTPATIENT
Start: 2025-03-27 | End: 2026-03-27

## 2025-04-07 ENCOUNTER — APPOINTMENT (OUTPATIENT)
Dept: CARDIOLOGY | Facility: CLINIC | Age: 60
End: 2025-04-07
Payer: COMMERCIAL

## 2025-04-07 VITALS
WEIGHT: 194.4 LBS | BODY MASS INDEX: 28.69 KG/M2 | DIASTOLIC BLOOD PRESSURE: 67 MMHG | SYSTOLIC BLOOD PRESSURE: 98 MMHG | HEART RATE: 89 BPM | OXYGEN SATURATION: 97 %

## 2025-04-07 DIAGNOSIS — D86.85 CARDIAC SARCOIDOSIS: ICD-10-CM

## 2025-04-07 DIAGNOSIS — I42.8 NONISCHEMIC CARDIOMYOPATHY (MULTI): ICD-10-CM

## 2025-04-07 DIAGNOSIS — I48.0 PAROXYSMAL ATRIAL FIBRILLATION (MULTI): ICD-10-CM

## 2025-04-07 DIAGNOSIS — I50.20 ACC/AHA STAGE C SYSTOLIC HEART FAILURE: Primary | ICD-10-CM

## 2025-04-07 PROCEDURE — 99214 OFFICE O/P EST MOD 30 MIN: CPT | Performed by: INTERNAL MEDICINE

## 2025-04-07 PROCEDURE — 1036F TOBACCO NON-USER: CPT | Performed by: INTERNAL MEDICINE

## 2025-04-07 NOTE — PROGRESS NOTES
[facility]  Primary Care Physician: Adrian Li MD  Referring Provider/Cardiologist: Michael     Date of Visit: 2025  4:20 PM EDT  Location of visit: [address]     HPI:   Mr. Song is a 59M (ABO: A) with a PMHx sig for stage C systolic HF/NICM (sarcoid)/HFrEF with severe LV dysfunction and high grade AVB (Mobitz II) from sarcoidosis s/p CRT-D who returns to the Advanced Heart Failure clinic for ongoing evaluation and management.     Interval hx:   He completed his CPET. He currently denies chest pain, pressure, SOB/STEVE, PND, orthopnea, LE edema, light headedness, dizziness, or syncope. He notes occasional palpitations.         SocHx:   , lives with long time girlfriend in [place]. Works full time as bread /salesman.  Denies tobacco/illicits, rare etOH    FamHx:  Mother: HTN, Afib, [place], HFpEF  Father: CAD s/p CABG x 4 in 70's  Oldest Brother: CAD  Paternal grandmother:  of heart problems  Paternal Uncles x 3:  of MI's  Maternal grandfather: Pacemaker  Paternal grandmother: CVA,  from MI later       Current Outpatient Medications   Medication Sig Dispense Refill    apixaban (Eliquis) 5 mg tablet Take 1 tablet (5 mg) by mouth 2 times a day. 180 tablet 3    cholecalciferol (Vitamin D-3) 5,000 Units tablet Take 1 tablet (5,000 Units) by mouth once daily.      Entresto 24-26 mg tablet Take 1 tablet by mouth 2 times a day. 180 tablet 3    folic acid (Folvite) 1 mg tablet Take 1 tablet (1 mg) by mouth once daily. 30 tablet 11    Jardiance 10 mg Take 1 tablet (10 mg) by mouth once daily. 42 tablet 0    methotrexate (Trexall) 2.5 mg tablet Take 6 tablets (15 mg total) by mouth 1 (one) time per week.  Follow directions carefully, and ask to explain any part you do not understand. Take exactly as directed. 24 tablet 11    metoprolol succinate XL (Toprol-XL) 25 mg 24 hr tablet Take 1 tablet (25 mg) by mouth once daily. Do  not crush or chew. 30 tablet 11    predniSONE (Deltasone) 10 mg tablet TAKE 1 TABLET BY MOUTH ONCE DAILY (Patient taking differently: Take 0.5 tablets (5 mg) by mouth once daily.) 30 tablet 1    rosuvastatin (Crestor) 20 mg tablet Take 1 tablet (20 mg) by mouth once daily. 90 tablet 3    spironolactone (Aldactone) 25 mg tablet Take 0.5 tablets (12.5 mg) by mouth once daily. 45 tablet 3    valACYclovir (Valtrex) 500 mg tablet Take 1 tablet (500 mg) by mouth once daily as needed (As needed for flare ups). 30 tablet 0     No current facility-administered medications for this visit.       Allergies   Allergen Reactions    House Dust Runny nose     asthma       Visit Vitals  BP 98/67 (BP Location: Right arm, Patient Position: Sitting)   Pulse 89   Wt 88.2 kg (194 lb 6.4 oz)   SpO2 97%   BMI 28.69 kg/m²   Smoking Status Never   BSA 2.07 m²        Physical Exam:  On exam Mr. Song appears his stated age, is alert and oriented x3, and in no acute distress. His sclera are anicteric and his oropharynx has moist mucous membranes. His neck is supple and without thyromegaly. The JVP is ~7 cm of water above the right atrium. His cardiac exam has regular rhythm, normal S1, S2. No S3/4. There are no murmurs. His lungs are clear to auscultation bilaterally and there is no dullness to percussion. His abdomen is soft, nontender with normoactive bowel sounds. There is no HJR. The extremities are warm and without edema. The skin is dry. There is no rash present. The distal pulses are 2+ in all four extremities. His mood and affect are appropriate for todays encounter.      Cardiac Labs/Diagnostics:    Lab Results   Component Value Date    CREATININE 1.16 11/18/2024    BUN 21 11/18/2024     11/18/2024    K 3.9 11/18/2024     (H) 11/18/2024    CO2 28 11/18/2024        Recent Labs     11/18/24  0809 10/23/23  1530 12/21/22  0920 09/15/21  1104 03/07/21  0647   CHOL 162 277* 188 140 154   LDLF  --   --  119* 77 98   LDLCALC 86  172*  --   --   --    HDL 56.1 86.3 56.1 53.0 38.0*   TRIG 99 96 64 51 92       Recent Labs     07/19/23  0850 02/25/22  1126   * 151*       CPET (3/13/25):  1. Peak oxygen consumption 22.9 ml/kg/min (67% predicted) does not need correction for lean body mass.  2. Normal BP response to exercise, No EOV, normal VE/VCo2 slope (26), near normal Vo2/work rate (8.6).  3. EKG showed ventricular pacing with PVCs which decreased during exercise, test nondiagnostic for ischemia due to pacing.  4. Test was submaximal based on v slope and RER <1.05 but based on the available data no evidence of a more than mild cardiac or pulmonary limitation.    Echo (2/24/25):  1. Left ventricular ejection fraction is moderately decreased, calculated by Manriquez's biplane at 34%.  2. There is global hypokinesis of the left ventricle with minor regional variations.    PET (10/15/24):  1. Newly developed small-sized moderate perfusion defects involving the mid to basal anterior wall. Additionally, Diffuse FDG uptake throughout the left ventricle myocardium, suggestive of suboptimal fasting. Consider repeat examination as clinically indicated.  2. Moderate dilatation of left ventricle.  3.  Gated imaging demonstrates global hypokinesis with a LV ejection fraction calculated at 41%, previously 34%.  4. No other concerning FDG uptake throughout the body to suggest sarcoid involvement.    ECG (10/7/24):  A-V paced (HR 74)    Echo (1/15/24):  1. Left ventricular systolic function is severely decreased with a 25% estimated ejection fraction.  2. Multiple segmental abnormalities exist. See findings.  3. Abnormal septal motion consistent with RV pacemaker.  4. Spectral Doppler shows an impaired relaxation pattern of left ventricular diastolic filling.  5. The left atrium is moderately dilated.  6. RVSP within normal limits.  7. Compared to 7/2023 no change is seen.  8. There are multiple wall motion abnormalities.    Echo (7/10/23):  1. Left  ventricular systolic function is severely decreased with a 20-25% estimated ejection fraction. 3D with contrat ej fx 21%, posterior akinenitic scar, severe dilation, no thrombus  2. Left ventricular cavity size is severely dilated.  3. There are multiple wall motion abnormalities.  4. Multiple segmental abnormalities exist. See findings.  5. There is low normal right ventricular systolic function.  6. Aortic valve sclerosis.  7. Moderate mitral valve regurgitation.    ECG (3/28/23):  A-V paced (HR 74)    PET scan (3/2/23):  1. Assuming appropriate fasting, there is increased metabolic activity along the basal 2/3 of the anterior, lateral, inferior, and septal left ventricular wall consistent with an inflammatory process such as myocarditis or sarcoidosis.  2. Perfusion defect in the inferior 1/3 of the inferior-septal wall, and a small territory of perfusion defect in the inferior apex, suggestive of myocardial scarring.  3. Cardiomegaly with decreased ejection fraction at 34% (normal above 45%).  4. Hypermetabolic left hilar, left paratracheal, and right paratracheal lymph nodes are consistent with sarcoidosis involvement.    cMRI (4/5/22):  1. Biventricular Cardiomyopathy.  Differential includes but is not limited to cardiac sarcoidosis, arrhythmogenic rightventricular cardiomyopathy, hypertrophic cardiomyopathy. The findings of this study are consistent with a single major CMR criteria for the diagnosis of arrhythmogenic right ventricular cardiomyopathy per the 2010 Task Force criteria(focal RV dyskinesis and RV EF </= 40%).  2. Extensive confluent hyperenhancement the basal/mid inferior and inferior septal walls with partial sparing the left ventricular basal/mid subendocardial myocardium. Subendocardial hyperenhancement of the basal/mid inferior and lateral right ventricular myocardium.  3. T2 STIR: Increased signal intensity of the mid basal inferior septum and lateral RV myocardium. Finding consistent with  myocardial inflammation/edema.  4. Asymmetric septal hypertrophy. Maximal septal thickness mid inferior septum 1.3 cm.  5. Dilated left ventricular cavity size with moderately depressed systolic function. Ejection fraction 37%.  6. Dilated right ventricular cavity size with mild-moderate depressed RV systolic function. RV EF 40%. Focal dyskinesis of the basal inferior wall.  7. Compared to previous cardiac MRI 6/29/21 there is an increase in left ventricular and right ventricular cavity volumes and decrease in left ventricular and right ventricular systolic function.    cMRI (6/29/21):  1. The findings of this study are suggestive of cardiac sarcoidosis. Differential would also include but is not limited to hypertrophic cardiomyopathy, arrhythmogenic right ventricular cardiomyopathy.  2. Extensive confluent hyperenhancement the basal/mid inferior and inferior septal walls with partial sparing the left ventricular basal/mid subendocardial myocardium. Subendocardial hyperenhancement of the basal/mid inferior right ventricular myocardium  3. T2 STIR: Increased signal intensity of the mid basal inferior septum. Finding consistent with myocardial inflammation/edema.  4. Asymmetric septal hypertrophy. Maximal septal thickness mid inferior septum 1.9 cm.  5. Normal left ventricular cavity size with moderately depressed systolic function. Ejection fraction 42%.  6. Normal right ventricular cavity size with mild-moderate depressed RV systolic function. RV EF 45%. Focal dyskinesis of the basal inferior wall.    Cardiac cath (3/15/21):  Mild CAD.     Echo (3/6/21):  1. The left ventricular systolic function is mildly decreased.  2. Multiple segmental abnormalities exist. See findings.  3. Aortic valve stenosis is not present.  4. There is global hypokinesis of the left ventricle with minor regional variations.      Impression/Plan:  Mr. Song is a 59M (ABO: A) with a PMHx sig for stage C systolic HF/NICM (sarcoid)/HFrEF with  moderate LV dysfunction and high grade AVB (Mobitz II) from sarcoidosis s/p CRT-D who returns to the Advanced Heart Failure clinic for ongoing evaluation and management. At the current time he has functional class II symptoms and appears euvolemic on exam.     1) High grade AVB (Mobitz II) s/p dc-ppm secondary to sarcoidosis now s/p CRT-D  cMRI with LV dysfunction and findings consistent with cardiac sarcoidosis, including both scar and inflammation. Remains on methotrexate and prednisone.  -c/w prednisone and methotrexate; f/u with Dr. Goodrich    2) Stage C chronic systolic HF/NICM (sarcoid)/HFrEF with moderate LV dysfunction (LVEF 34%; 2/2025) s/p CRT-D  Persistent LV dysfunction despite treatment for sarcoid and HFrEF. CPET performed to establish a baseline. VO2 22 noting no more than mild limitations; no need for adv therapies at the current time.    -c/w metoprolol succinate 25 mg daily, entresto 24/26 mg bid, jardiance 10 mg daily, spironolactone 12.5 mg daily     3) pAF on DOAC therapy  -c/w metoprolol succinate, eliquis 5 mg bid  -f/u with EP    4) Dyslipidemia  Lipids (11/2024): tChol 162, HDL 56, LDL 86, Trigs 99  -c/w rosuvastatin 20 mg daily      F/U: 6 months at /[facility]    ____________________________________________________________  Jh De La Vega DO  Section of Advanced Heart Failure and Cardiac Transplantation  Division of Cardiovascular Medicine  [facility]  [facility]

## 2025-04-25 DIAGNOSIS — B00.9 HERPES: ICD-10-CM

## 2025-04-25 RX ORDER — VALACYCLOVIR HYDROCHLORIDE 500 MG/1
500 TABLET, FILM COATED ORAL DAILY PRN
Qty: 30 TABLET | Refills: 5 | Status: SHIPPED | OUTPATIENT
Start: 2025-04-25

## 2025-05-02 DIAGNOSIS — D86.0 PULMONARY SARCOIDOSIS (MULTI): ICD-10-CM

## 2025-05-29 ENCOUNTER — TELEPHONE (OUTPATIENT)
Dept: PULMONOLOGY | Facility: HOSPITAL | Age: 60
End: 2025-05-29
Payer: COMMERCIAL

## 2025-05-29 DIAGNOSIS — D86.85 SARCOID MYOCARDITIS (HHS-HCC): ICD-10-CM

## 2025-05-29 RX ORDER — PREDNISONE 10 MG/1
10 TABLET ORAL DAILY
Qty: 30 TABLET | Refills: 1 | OUTPATIENT
Start: 2025-05-29

## 2025-05-29 RX ORDER — PREDNISONE 10 MG/1
5 TABLET ORAL DAILY
Qty: 15 TABLET | Refills: 2 | Status: SHIPPED | OUTPATIENT
Start: 2025-05-29

## 2025-05-29 NOTE — TELEPHONE ENCOUNTER
Pt reached out needing a refill on his prednisone. Refill order placed and routed to Dr. Goodrich to sign.

## 2025-07-03 ENCOUNTER — HOSPITAL ENCOUNTER (OUTPATIENT)
Dept: CARDIOLOGY | Facility: HOSPITAL | Age: 60
Discharge: HOME | End: 2025-07-03
Payer: COMMERCIAL

## 2025-07-03 DIAGNOSIS — Z95.810 PRESENCE OF AUTOMATIC CARDIOVERTER/DEFIBRILLATOR (AICD): ICD-10-CM

## 2025-07-03 PROCEDURE — 93296 REM INTERROG EVL PM/IDS: CPT

## 2025-07-03 PROCEDURE — 93295 DEV INTERROG REMOTE 1/2/MLT: CPT | Performed by: INTERNAL MEDICINE

## 2025-07-14 DIAGNOSIS — I50.20 ACC/AHA STAGE C SYSTOLIC HEART FAILURE: ICD-10-CM

## 2025-07-14 DIAGNOSIS — Z79.631 ON METHOTREXATE THERAPY: ICD-10-CM

## 2025-07-14 RX ORDER — SPIRONOLACTONE 25 MG/1
12.5 TABLET ORAL DAILY
Qty: 45 TABLET | Refills: 3 | Status: SHIPPED | OUTPATIENT
Start: 2025-07-14

## 2025-07-15 RX ORDER — FOLIC ACID 1 MG/1
1 TABLET ORAL DAILY
Qty: 30 TABLET | Refills: 2 | Status: SHIPPED | OUTPATIENT
Start: 2025-07-15 | End: 2026-07-15

## 2025-07-24 DIAGNOSIS — I48.0 PAROXYSMAL ATRIAL FIBRILLATION WITH RAPID VENTRICULAR RESPONSE (MULTI): Primary | ICD-10-CM

## 2025-07-24 DIAGNOSIS — I50.20 ACC/AHA STAGE C SYSTOLIC HEART FAILURE: ICD-10-CM

## 2025-07-24 PROCEDURE — RXMED WILLOW AMBULATORY MEDICATION CHARGE

## 2025-07-24 RX ORDER — SACUBITRIL AND VALSARTAN 24; 26 MG/1; MG/1
1 TABLET, FILM COATED ORAL 2 TIMES DAILY
Qty: 60 TABLET | Refills: 0 | Status: SHIPPED | OUTPATIENT
Start: 2025-07-24 | End: 2025-07-24 | Stop reason: SDUPTHER

## 2025-07-24 RX ORDER — SACUBITRIL AND VALSARTAN 24; 26 MG/1; MG/1
1 TABLET, FILM COATED ORAL 2 TIMES DAILY
Qty: 60 TABLET | Refills: 0 | Status: SHIPPED | OUTPATIENT
Start: 2025-07-24

## 2025-07-25 DIAGNOSIS — D86.0 PULMONARY SARCOIDOSIS (MULTI): ICD-10-CM

## 2025-07-28 PROCEDURE — RXMED WILLOW AMBULATORY MEDICATION CHARGE

## 2025-07-30 ENCOUNTER — PHARMACY VISIT (OUTPATIENT)
Dept: PHARMACY | Facility: CLINIC | Age: 60
End: 2025-07-30
Payer: COMMERCIAL

## 2025-08-07 DIAGNOSIS — I50.20 SYSTOLIC HEART FAILURE, ACC/AHA STAGE C: Primary | ICD-10-CM

## 2025-08-07 DIAGNOSIS — I50.20 ACC/AHA STAGE C SYSTOLIC HEART FAILURE: ICD-10-CM

## 2025-08-07 DIAGNOSIS — I48.0 PAROXYSMAL ATRIAL FIBRILLATION WITH RAPID VENTRICULAR RESPONSE (MULTI): ICD-10-CM

## 2025-08-13 PROCEDURE — RXMED WILLOW AMBULATORY MEDICATION CHARGE

## 2025-08-14 ENCOUNTER — PHARMACY VISIT (OUTPATIENT)
Dept: PHARMACY | Facility: CLINIC | Age: 60
End: 2025-08-14
Payer: COMMERCIAL

## 2025-08-15 PROCEDURE — RXMED WILLOW AMBULATORY MEDICATION CHARGE

## 2025-08-16 ENCOUNTER — PHARMACY VISIT (OUTPATIENT)
Dept: PHARMACY | Facility: CLINIC | Age: 60
End: 2025-08-16
Payer: COMMERCIAL

## 2025-08-20 PROCEDURE — RXMED WILLOW AMBULATORY MEDICATION CHARGE

## 2025-08-21 ENCOUNTER — PHARMACY VISIT (OUTPATIENT)
Dept: PHARMACY | Facility: CLINIC | Age: 60
End: 2025-08-21
Payer: COMMERCIAL

## 2025-08-25 DIAGNOSIS — I50.20 SYSTOLIC HEART FAILURE, ACC/AHA STAGE C: ICD-10-CM

## 2025-08-25 DIAGNOSIS — I48.0 PAROXYSMAL ATRIAL FIBRILLATION WITH RAPID VENTRICULAR RESPONSE (MULTI): ICD-10-CM

## 2025-08-25 DIAGNOSIS — I50.20 ACC/AHA STAGE C SYSTOLIC HEART FAILURE: ICD-10-CM

## 2025-08-25 PROCEDURE — RXMED WILLOW AMBULATORY MEDICATION CHARGE

## 2025-08-25 RX ORDER — SACUBITRIL AND VALSARTAN 24; 26 MG/1; MG/1
1 TABLET ORAL 2 TIMES DAILY
Qty: 180 TABLET | Refills: 3 | Status: SHIPPED | OUTPATIENT
Start: 2025-08-25 | End: 2025-08-26 | Stop reason: SDUPTHER

## 2025-08-26 ENCOUNTER — PHARMACY VISIT (OUTPATIENT)
Dept: PHARMACY | Facility: CLINIC | Age: 60
End: 2025-08-26
Payer: COMMERCIAL

## 2025-08-26 PROCEDURE — RXMED WILLOW AMBULATORY MEDICATION CHARGE

## 2025-08-26 RX ORDER — SACUBITRIL AND VALSARTAN 24; 26 MG/1; MG/1
1 TABLET ORAL 2 TIMES DAILY
Qty: 180 TABLET | Refills: 3 | Status: SHIPPED | OUTPATIENT
Start: 2025-08-26

## 2025-09-02 PROCEDURE — RXMED WILLOW AMBULATORY MEDICATION CHARGE

## 2025-09-04 ENCOUNTER — PHARMACY VISIT (OUTPATIENT)
Dept: PHARMACY | Facility: CLINIC | Age: 60
End: 2025-09-04
Payer: COMMERCIAL

## 2025-10-07 ENCOUNTER — APPOINTMENT (OUTPATIENT)
Dept: CARDIOLOGY | Facility: CLINIC | Age: 60
End: 2025-10-07
Payer: COMMERCIAL

## 2025-10-13 ENCOUNTER — APPOINTMENT (OUTPATIENT)
Dept: CARDIOLOGY | Facility: CLINIC | Age: 60
End: 2025-10-13
Payer: COMMERCIAL

## 2025-10-20 ENCOUNTER — APPOINTMENT (OUTPATIENT)
Dept: CARDIOLOGY | Facility: CLINIC | Age: 60
End: 2025-10-20
Payer: COMMERCIAL

## 2026-03-16 ENCOUNTER — APPOINTMENT (OUTPATIENT)
Dept: CARDIOLOGY | Facility: CLINIC | Age: 61
End: 2026-03-16
Payer: COMMERCIAL